# Patient Record
Sex: MALE | Race: WHITE | NOT HISPANIC OR LATINO | Employment: FULL TIME | URBAN - METROPOLITAN AREA
[De-identification: names, ages, dates, MRNs, and addresses within clinical notes are randomized per-mention and may not be internally consistent; named-entity substitution may affect disease eponyms.]

---

## 2019-05-08 ENCOUNTER — APPOINTMENT (OUTPATIENT)
Dept: CT IMAGING | Facility: HOSPITAL | Age: 54
End: 2019-05-08

## 2019-05-08 ENCOUNTER — HOSPITAL ENCOUNTER (OUTPATIENT)
Facility: HOSPITAL | Age: 54
Discharge: HOME | End: 2019-05-09
Attending: EMERGENCY MEDICINE | Admitting: INTERNAL MEDICINE

## 2019-05-08 ENCOUNTER — APPOINTMENT (OUTPATIENT)
Dept: GENERAL RADIOLOGY | Facility: HOSPITAL | Age: 54
End: 2019-05-08

## 2019-05-08 DIAGNOSIS — R55 SYNCOPE, UNSPECIFIED SYNCOPE TYPE: ICD-10-CM

## 2019-05-08 DIAGNOSIS — I20.0 UNSTABLE ANGINA (HCC): ICD-10-CM

## 2019-05-08 DIAGNOSIS — R07.9 CHEST PAIN, UNSPECIFIED TYPE: Primary | ICD-10-CM

## 2019-05-08 DIAGNOSIS — I25.10 CORONARY ARTERY DISEASE, ANGINA PRESENCE UNSPECIFIED, UNSPECIFIED VESSEL OR LESION TYPE, UNSPECIFIED WHETHER NATIVE OR TRANSPLANTED HEART: ICD-10-CM

## 2019-05-08 DIAGNOSIS — E87.6 HYPOKALEMIA: ICD-10-CM

## 2019-05-08 PROBLEM — F32.A DEPRESSION: Status: ACTIVE | Noted: 2019-05-08

## 2019-05-08 PROBLEM — E11.9 DM (DIABETES MELLITUS), TYPE 2: Status: ACTIVE | Noted: 2019-05-08

## 2019-05-08 PROBLEM — I49.3 FREQUENT UNIFOCAL PVCS: Status: ACTIVE | Noted: 2019-05-08

## 2019-05-08 PROBLEM — I10 ESSENTIAL HYPERTENSION: Status: ACTIVE | Noted: 2019-05-08

## 2019-05-08 PROBLEM — E78.5 HYPERLIPIDEMIA LDL GOAL <70: Status: ACTIVE | Noted: 2019-05-08

## 2019-05-08 LAB
ALBUMIN SERPL-MCNC: 3.9 G/DL (ref 3.5–5.2)
ALBUMIN/GLOB SERPL: 1.1 G/DL
ALP SERPL-CCNC: 61 U/L (ref 39–117)
ALT SERPL W P-5'-P-CCNC: 22 U/L (ref 1–41)
AMPHET+METHAMPHET UR QL: NEGATIVE
AMPHETAMINES UR QL: NEGATIVE
ANION GAP SERPL CALCULATED.3IONS-SCNC: 16 MMOL/L
AST SERPL-CCNC: 22 U/L (ref 1–40)
BARBITURATES UR QL SCN: NEGATIVE
BASOPHILS # BLD AUTO: 0.05 10*3/MM3 (ref 0–0.2)
BASOPHILS NFR BLD AUTO: 0.5 % (ref 0–1.5)
BENZODIAZ UR QL SCN: NEGATIVE
BILIRUB SERPL-MCNC: 0.6 MG/DL (ref 0.2–1.2)
BILIRUB UR QL STRIP: NEGATIVE
BUN BLD-MCNC: 14 MG/DL (ref 6–20)
BUN/CREAT SERPL: 13.1 (ref 7–25)
BUPRENORPHINE SERPL-MCNC: NEGATIVE NG/ML
CALCIUM SPEC-SCNC: 9.2 MG/DL (ref 8.6–10.5)
CANNABINOIDS SERPL QL: NEGATIVE
CHLORIDE SERPL-SCNC: 92 MMOL/L (ref 98–107)
CLARITY UR: CLEAR
CO2 SERPL-SCNC: 22 MMOL/L (ref 22–29)
COCAINE UR QL: NEGATIVE
COLOR UR: YELLOW
CREAT BLD-MCNC: 1.07 MG/DL (ref 0.76–1.27)
DEPRECATED RDW RBC AUTO: 39.4 FL (ref 37–54)
EOSINOPHIL # BLD AUTO: 0.38 10*3/MM3 (ref 0–0.4)
EOSINOPHIL NFR BLD AUTO: 3.9 % (ref 0.3–6.2)
ERYTHROCYTE [DISTWIDTH] IN BLOOD BY AUTOMATED COUNT: 13.1 % (ref 12.3–15.4)
GFR SERPL CREATININE-BSD FRML MDRD: 72 ML/MIN/1.73
GLOBULIN UR ELPH-MCNC: 3.5 GM/DL
GLUCOSE BLD-MCNC: 284 MG/DL (ref 65–99)
GLUCOSE BLDC GLUCOMTR-MCNC: 191 MG/DL (ref 70–130)
GLUCOSE UR STRIP-MCNC: ABNORMAL MG/DL
HCT VFR BLD AUTO: 40.7 % (ref 37.5–51)
HGB BLD-MCNC: 13.9 G/DL (ref 13–17.7)
HGB UR QL STRIP.AUTO: NEGATIVE
HOLD SPECIMEN: NORMAL
HOLD SPECIMEN: NORMAL
IMM GRANULOCYTES # BLD AUTO: 0.03 10*3/MM3 (ref 0–0.05)
IMM GRANULOCYTES NFR BLD AUTO: 0.3 % (ref 0–0.5)
KETONES UR QL STRIP: ABNORMAL
LEUKOCYTE ESTERASE UR QL STRIP.AUTO: NEGATIVE
LIPASE SERPL-CCNC: 87 U/L (ref 13–60)
LYMPHOCYTES # BLD AUTO: 2.08 10*3/MM3 (ref 0.7–3.1)
LYMPHOCYTES NFR BLD AUTO: 21.4 % (ref 19.6–45.3)
MAGNESIUM SERPL-MCNC: 1.6 MG/DL (ref 1.6–2.6)
MCH RBC QN AUTO: 28.2 PG (ref 26.6–33)
MCHC RBC AUTO-ENTMCNC: 34.2 G/DL (ref 31.5–35.7)
MCV RBC AUTO: 82.6 FL (ref 79–97)
METHADONE UR QL SCN: NEGATIVE
MONOCYTES # BLD AUTO: 0.95 10*3/MM3 (ref 0.1–0.9)
MONOCYTES NFR BLD AUTO: 9.8 % (ref 5–12)
NEUTROPHILS # BLD AUTO: 6.26 10*3/MM3 (ref 1.7–7)
NEUTROPHILS NFR BLD AUTO: 64.4 % (ref 42.7–76)
NITRITE UR QL STRIP: NEGATIVE
NT-PROBNP SERPL-MCNC: 74 PG/ML (ref 5–900)
OPIATES UR QL: NEGATIVE
OXYCODONE UR QL SCN: NEGATIVE
PCP UR QL SCN: NEGATIVE
PH UR STRIP.AUTO: <=5 [PH] (ref 5–8)
PLATELET # BLD AUTO: 209 10*3/MM3 (ref 140–450)
PMV BLD AUTO: 9.8 FL (ref 6–12)
POTASSIUM BLD-SCNC: 2.8 MMOL/L (ref 3.5–5.2)
PROPOXYPH UR QL: NEGATIVE
PROT SERPL-MCNC: 7.4 G/DL (ref 6–8.5)
PROT UR QL STRIP: NEGATIVE
RBC # BLD AUTO: 4.93 10*6/MM3 (ref 4.14–5.8)
SODIUM BLD-SCNC: 130 MMOL/L (ref 136–145)
SP GR UR STRIP: 1.04 (ref 1–1.03)
TRICYCLICS UR QL SCN: POSITIVE
TROPONIN T SERPL-MCNC: <0.01 NG/ML (ref 0–0.03)
UROBILINOGEN UR QL STRIP: ABNORMAL
WBC NRBC COR # BLD: 9.72 10*3/MM3 (ref 3.4–10.8)
WHOLE BLOOD HOLD SPECIMEN: NORMAL
WHOLE BLOOD HOLD SPECIMEN: NORMAL

## 2019-05-08 PROCEDURE — 80306 DRUG TEST PRSMV INSTRMNT: CPT | Performed by: EMERGENCY MEDICINE

## 2019-05-08 PROCEDURE — 96366 THER/PROPH/DIAG IV INF ADDON: CPT

## 2019-05-08 PROCEDURE — 84484 ASSAY OF TROPONIN QUANT: CPT | Performed by: EMERGENCY MEDICINE

## 2019-05-08 PROCEDURE — 83735 ASSAY OF MAGNESIUM: CPT | Performed by: EMERGENCY MEDICINE

## 2019-05-08 PROCEDURE — G0378 HOSPITAL OBSERVATION PER HR: HCPCS

## 2019-05-08 PROCEDURE — 99205 OFFICE O/P NEW HI 60 MIN: CPT | Performed by: INTERNAL MEDICINE

## 2019-05-08 PROCEDURE — 96365 THER/PROPH/DIAG IV INF INIT: CPT

## 2019-05-08 PROCEDURE — 83690 ASSAY OF LIPASE: CPT | Performed by: EMERGENCY MEDICINE

## 2019-05-08 PROCEDURE — 71045 X-RAY EXAM CHEST 1 VIEW: CPT

## 2019-05-08 PROCEDURE — 93005 ELECTROCARDIOGRAM TRACING: CPT | Performed by: EMERGENCY MEDICINE

## 2019-05-08 PROCEDURE — 82962 GLUCOSE BLOOD TEST: CPT

## 2019-05-08 PROCEDURE — 99285 EMERGENCY DEPT VISIT HI MDM: CPT

## 2019-05-08 PROCEDURE — 96361 HYDRATE IV INFUSION ADD-ON: CPT

## 2019-05-08 PROCEDURE — 93005 ELECTROCARDIOGRAM TRACING: CPT

## 2019-05-08 PROCEDURE — 81003 URINALYSIS AUTO W/O SCOPE: CPT | Performed by: EMERGENCY MEDICINE

## 2019-05-08 PROCEDURE — 96372 THER/PROPH/DIAG INJ SC/IM: CPT

## 2019-05-08 PROCEDURE — 70450 CT HEAD/BRAIN W/O DYE: CPT

## 2019-05-08 PROCEDURE — 25010000002 ENOXAPARIN PER 10 MG: Performed by: PHYSICIAN ASSISTANT

## 2019-05-08 PROCEDURE — 71275 CT ANGIOGRAPHY CHEST: CPT

## 2019-05-08 PROCEDURE — 0 IOPAMIDOL PER 1 ML: Performed by: EMERGENCY MEDICINE

## 2019-05-08 PROCEDURE — 80053 COMPREHEN METABOLIC PANEL: CPT | Performed by: EMERGENCY MEDICINE

## 2019-05-08 PROCEDURE — 85025 COMPLETE CBC W/AUTO DIFF WBC: CPT

## 2019-05-08 PROCEDURE — 84484 ASSAY OF TROPONIN QUANT: CPT | Performed by: PHYSICIAN ASSISTANT

## 2019-05-08 PROCEDURE — 83880 ASSAY OF NATRIURETIC PEPTIDE: CPT | Performed by: EMERGENCY MEDICINE

## 2019-05-08 PROCEDURE — 25010000002 THIAMINE PER 100 MG: Performed by: EMERGENCY MEDICINE

## 2019-05-08 RX ORDER — ALUMINA, MAGNESIA, AND SIMETHICONE 2400; 2400; 240 MG/30ML; MG/30ML; MG/30ML
15 SUSPENSION ORAL EVERY 6 HOURS PRN
Status: DISCONTINUED | OUTPATIENT
Start: 2019-05-08 | End: 2019-05-09 | Stop reason: HOSPADM

## 2019-05-08 RX ORDER — SODIUM CHLORIDE 0.9 % (FLUSH) 0.9 %
10 SYRINGE (ML) INJECTION AS NEEDED
Status: DISCONTINUED | OUTPATIENT
Start: 2019-05-08 | End: 2019-05-09 | Stop reason: HOSPADM

## 2019-05-08 RX ORDER — ASPIRIN 325 MG
325 TABLET ORAL DAILY
COMMUNITY
End: 2022-03-04 | Stop reason: DRUGHIGH

## 2019-05-08 RX ORDER — NICOTINE POLACRILEX 4 MG
15 LOZENGE BUCCAL
Status: DISCONTINUED | OUTPATIENT
Start: 2019-05-08 | End: 2019-05-08 | Stop reason: SDUPTHER

## 2019-05-08 RX ORDER — ASPIRIN 325 MG
325 TABLET ORAL DAILY
Status: DISCONTINUED | OUTPATIENT
Start: 2019-05-09 | End: 2019-05-09 | Stop reason: HOSPADM

## 2019-05-08 RX ORDER — FAMOTIDINE 20 MG/1
40 TABLET, FILM COATED ORAL DAILY
Status: DISCONTINUED | OUTPATIENT
Start: 2019-05-09 | End: 2019-05-09 | Stop reason: HOSPADM

## 2019-05-08 RX ORDER — ACETAMINOPHEN 325 MG/1
650 TABLET ORAL EVERY 4 HOURS PRN
Status: DISCONTINUED | OUTPATIENT
Start: 2019-05-08 | End: 2019-05-09 | Stop reason: HOSPADM

## 2019-05-08 RX ORDER — ASPIRIN 325 MG
325 TABLET ORAL DAILY
Status: DISCONTINUED | OUTPATIENT
Start: 2019-05-09 | End: 2019-05-08 | Stop reason: SDUPTHER

## 2019-05-08 RX ORDER — ASPIRIN 81 MG/1
324 TABLET, CHEWABLE ORAL ONCE
Status: DISCONTINUED | OUTPATIENT
Start: 2019-05-08 | End: 2019-05-09 | Stop reason: HOSPADM

## 2019-05-08 RX ORDER — LISINOPRIL 40 MG/1
40 TABLET ORAL DAILY
Status: DISCONTINUED | OUTPATIENT
Start: 2019-05-09 | End: 2019-05-09 | Stop reason: HOSPADM

## 2019-05-08 RX ORDER — ROSUVASTATIN CALCIUM 20 MG/1
40 TABLET, COATED ORAL DAILY
Status: DISCONTINUED | OUTPATIENT
Start: 2019-05-09 | End: 2019-05-09 | Stop reason: HOSPADM

## 2019-05-08 RX ORDER — DEXTROSE MONOHYDRATE 25 G/50ML
25 INJECTION, SOLUTION INTRAVENOUS
Status: DISCONTINUED | OUTPATIENT
Start: 2019-05-08 | End: 2019-05-08 | Stop reason: SDUPTHER

## 2019-05-08 RX ORDER — SODIUM CHLORIDE 9 MG/ML
125 INJECTION, SOLUTION INTRAVENOUS CONTINUOUS
Status: DISCONTINUED | OUTPATIENT
Start: 2019-05-08 | End: 2019-05-09 | Stop reason: HOSPADM

## 2019-05-08 RX ORDER — FLUOXETINE HYDROCHLORIDE 20 MG/1
40 CAPSULE ORAL DAILY
Status: DISCONTINUED | OUTPATIENT
Start: 2019-05-09 | End: 2019-05-09 | Stop reason: HOSPADM

## 2019-05-08 RX ORDER — NITROGLYCERIN 0.4 MG/1
0.4 TABLET SUBLINGUAL
Status: DISCONTINUED | OUTPATIENT
Start: 2019-05-08 | End: 2019-05-09 | Stop reason: HOSPADM

## 2019-05-08 RX ORDER — COLESEVELAM 180 1/1
1875 TABLET ORAL 2 TIMES DAILY
COMMUNITY

## 2019-05-08 RX ORDER — SODIUM CHLORIDE 0.9 % (FLUSH) 0.9 %
3-10 SYRINGE (ML) INJECTION AS NEEDED
Status: DISCONTINUED | OUTPATIENT
Start: 2019-05-08 | End: 2019-05-09 | Stop reason: HOSPADM

## 2019-05-08 RX ORDER — DEXTROSE MONOHYDRATE 25 G/50ML
25 INJECTION, SOLUTION INTRAVENOUS
Status: DISCONTINUED | OUTPATIENT
Start: 2019-05-08 | End: 2019-05-09 | Stop reason: HOSPADM

## 2019-05-08 RX ORDER — ONDANSETRON 2 MG/ML
4 INJECTION INTRAMUSCULAR; INTRAVENOUS EVERY 6 HOURS PRN
Status: DISCONTINUED | OUTPATIENT
Start: 2019-05-08 | End: 2019-05-09 | Stop reason: HOSPADM

## 2019-05-08 RX ORDER — FLUOXETINE HYDROCHLORIDE 40 MG/1
40 CAPSULE ORAL DAILY
COMMUNITY

## 2019-05-08 RX ORDER — AMLODIPINE BESYLATE 10 MG/1
5 TABLET ORAL DAILY
COMMUNITY
End: 2021-02-25 | Stop reason: DRUGHIGH

## 2019-05-08 RX ORDER — ROSUVASTATIN CALCIUM 40 MG/1
40 TABLET, COATED ORAL DAILY
COMMUNITY

## 2019-05-08 RX ORDER — CHLORTHALIDONE 25 MG/1
25 TABLET ORAL DAILY
COMMUNITY
End: 2019-05-09 | Stop reason: HOSPADM

## 2019-05-08 RX ORDER — SENNA AND DOCUSATE SODIUM 50; 8.6 MG/1; MG/1
2 TABLET, FILM COATED ORAL 2 TIMES DAILY PRN
Status: DISCONTINUED | OUTPATIENT
Start: 2019-05-08 | End: 2019-05-09 | Stop reason: HOSPADM

## 2019-05-08 RX ORDER — GLIPIZIDE 10 MG/1
10 TABLET ORAL
COMMUNITY
End: 2022-03-04 | Stop reason: ALTCHOICE

## 2019-05-08 RX ORDER — SODIUM CHLORIDE 0.9 % (FLUSH) 0.9 %
3 SYRINGE (ML) INJECTION EVERY 12 HOURS SCHEDULED
Status: DISCONTINUED | OUTPATIENT
Start: 2019-05-08 | End: 2019-05-09 | Stop reason: HOSPADM

## 2019-05-08 RX ORDER — ONDANSETRON 4 MG/1
4 TABLET, FILM COATED ORAL EVERY 6 HOURS PRN
Status: DISCONTINUED | OUTPATIENT
Start: 2019-05-08 | End: 2019-05-09 | Stop reason: HOSPADM

## 2019-05-08 RX ORDER — POTASSIUM CHLORIDE 750 MG/1
10 TABLET, FILM COATED, EXTENDED RELEASE ORAL 2 TIMES DAILY
COMMUNITY
End: 2019-05-09 | Stop reason: HOSPADM

## 2019-05-08 RX ORDER — NICOTINE POLACRILEX 4 MG
15 LOZENGE BUCCAL
Status: DISCONTINUED | OUTPATIENT
Start: 2019-05-08 | End: 2019-05-09 | Stop reason: HOSPADM

## 2019-05-08 RX ORDER — LISINOPRIL 40 MG/1
40 TABLET ORAL DAILY
COMMUNITY

## 2019-05-08 RX ORDER — POTASSIUM CHLORIDE 750 MG/1
40 CAPSULE, EXTENDED RELEASE ORAL ONCE
Status: COMPLETED | OUTPATIENT
Start: 2019-05-08 | End: 2019-05-08

## 2019-05-08 RX ADMIN — METOPROLOL TARTRATE 25 MG: 25 TABLET ORAL at 22:42

## 2019-05-08 RX ADMIN — THIAMINE HYDROCHLORIDE 100 MG: 100 INJECTION, SOLUTION INTRAMUSCULAR; INTRAVENOUS at 16:33

## 2019-05-08 RX ADMIN — POTASSIUM CHLORIDE 40 MEQ: 750 CAPSULE, EXTENDED RELEASE ORAL at 16:29

## 2019-05-08 RX ADMIN — ACETAMINOPHEN 650 MG: 325 TABLET ORAL at 19:20

## 2019-05-08 RX ADMIN — SODIUM CHLORIDE, PRESERVATIVE FREE 3 ML: 5 INJECTION INTRAVENOUS at 20:52

## 2019-05-08 RX ADMIN — SODIUM CHLORIDE 1000 ML: 9 INJECTION, SOLUTION INTRAVENOUS at 16:25

## 2019-05-08 RX ADMIN — SODIUM CHLORIDE 125 ML/HR: 9 INJECTION, SOLUTION INTRAVENOUS at 16:59

## 2019-05-08 RX ADMIN — IOPAMIDOL 75 ML: 755 INJECTION, SOLUTION INTRAVENOUS at 17:22

## 2019-05-08 RX ADMIN — ENOXAPARIN SODIUM 40 MG: 40 INJECTION SUBCUTANEOUS at 22:42

## 2019-05-08 NOTE — H&P
"[facility] at [facility]        Date of Hospital Visit: 19      Place of Service: [facility]    Patient Name: Andry Beth  :1965    Referral Provider: ED Physician  Primary Care Provider: Kevin Silvestre DO    Chief complaint/Reason for Consultation:  chest pain    Problem List:  Patient Active Problem List    Diagnosis    • Unstable angina (CMS/HCC)      Priority: High   • Syncope and collapse      Priority: High   • CAD (coronary artery disease)      Priority: High     Note Last Updated: 2019     1.  CABG x 4 (3-4-2008) LIMA to LAD, SVG diag and circ, SVG to RCA  2. Stents to unknown vessels  at [facility]   • Hypokalemia      Priority: High   • Frequent unifocal PVCs      Priority: High   • Essential hypertension      Priority: Medium   • Hyperlipidemia LDL goal <70      Priority: Medium   • DM (diabetes mellitus), type 2 (CMS/HCC)      Priority: Low   • Depression      History of Present Illness:  This is a 53-year old hypertensive dyslipidemic diabetic male with known coronary artery disease.  He underwent CABG x4 in  followed by stents to unknown vessels at [facility] in .  He has a cardiologist in [facility] whom he is only seen once approximately a year ago.  He was doing well from a cardiac standpoint until about 3 days ago when he began having progressive weakness which he attributed to environmental allergies.  He works at night and upon returning home this morning had apparent syncope which he has no recall.  States the last thing he remembers was turning off his truck and then being loaded into the helicopter for transport to [facility].  He reportedly \"passed out\" 2-3 times.  He reports having had shooting severe midsternal chest pain at least on 2 separate occasions the first being at approximately midnight last night and the second when being loaded to the helicopter.  He was given sublingual " nitroglycerin and Versed by flight paramedics.  His emergency department evaluation here has revealed frequent PVCs on EKG.  His initial troponin is negative.  His labs are significant for severely low potassium at 2.8, mild hyponatremia and hyperglycemia.  His chest x-ray is unremarkable.  He is just had a CT scan of the head and chest with preliminary reports negative for pathology.  At present he is alert and comfortable and in no apparent distress.  He denies orthopnea, PND, claudication, lower extremity edema.  He has no awareness of tachyarrhythmias, denies dizziness.    Past Medical History:   Diagnosis Date   • Hypertension        Past Surgical History:   Procedure Laterality Date   • CARDIAC SURGERY      HEART BYPASS SURGERY   • CHOLECYSTECTOMY         No Known Allergies      (Not in a hospital admission)  Home medications: Patient does not have a list or bottles but from memory takes:  Hydrochlorothiazide  Lisinopril 10 mg daily  Metoprolol  A depression pill  Jardiance  A cholesterol pill  Aspirin 3 and 25 mg daily  Metformin thousand milligrams      Current Facility-Administered Medications:   •  aspirin chewable tablet 324 mg, 324 mg, Oral, Once, Tian Hastings MD  •  sodium chloride 0.9 % flush 10 mL, 10 mL, Intravenous, PRN, Tian Hastings MD  •  sodium chloride 0.9 % infusion, 125 mL/hr, Intravenous, Continuous, Tian Hastings MD, Last Rate: 125 mL/hr at 05/08/19 1659, 125 mL/hr at 05/08/19 1659  •  thiamine (B-1) 100 mg in sodium chloride 0.9 % 100 mL IVPB, 100 mg, Intravenous, Daily, Tian Hastings MD, Last Rate: 200 mL/hr at 05/08/19 1633, 100 mg at 05/08/19 1633  No current outpatient medications on file.      Social History     Socioeconomic History   • Marital status:      Spouse name: Not on file   • Number of children: Not on file   • Years of education: Not on file   • Highest education level: Not on file   Occupational History   • Occupation:    Tobacco Use   •  "Smoking status: Never Smoker   • Smokeless tobacco: Never Used   Substance and Sexual Activity   • Alcohol use: No     Frequency: Never   • Drug use: No   • Sexual activity: Defer       Family History   Problem Relation Age of Onset   • Heart disease Mother         50's   • Heart disease Father         late 50's       REVIEW OF SYSTEMS:   Review of Systems   Constitution: Positive for weakness and malaise/fatigue.   HENT: Positive for congestion and hoarse voice.    Eyes: Negative.    Cardiovascular: Positive for chest pain. Negative for dyspnea on exertion, irregular heartbeat, leg swelling, orthopnea, palpitations and paroxysmal nocturnal dyspnea.   Respiratory: Negative.    Endocrine: Negative.    Hematologic/Lymphatic: Negative.    Skin: Negative.    Musculoskeletal: Negative.    Gastrointestinal: Negative.    Genitourinary: Negative.    Psychiatric/Behavioral: Positive for depression.   Allergic/Immunologic: Negative.    All other systems reviewed and are negative.           Objective:  Vitals:    05/08/19 1522 05/08/19 1530 05/08/19 1600 05/08/19 1630   BP: 129/84 115/84 118/83 120/81   Pulse: 114 113 104 110   Resp: 16      Temp: 98.7 °F (37.1 °C)      TempSrc: Oral      SpO2: 92% 96% 93% 97%   Weight:       Height:         Body mass index is 29.29 kg/m².  Flowsheet Rows      First Filed Value   Admission Height  180.3 cm (71\") Documented at 05/08/2019 1519   Admission Weight  95.3 kg (210 lb) Documented at 05/08/2019 1519          Intake/Output Summary (Last 24 hours) at 5/8/2019 1748  Last data filed at 5/8/2019 1659  Gross per 24 hour   Intake 1000 ml   Output --   Net 1000 ml       Physical Exam   Constitutional: He is oriented to person, place, and time. He appears well-developed and well-nourished.   HENT:   Head: Normocephalic and atraumatic.   Mouth/Throat: Oropharynx is clear and moist.   Eyes: EOM are normal. Pupils are equal, round, and reactive to light. No scleral icterus.   Neck: Neck supple. No " JVD present. No thyromegaly present.   Cardiovascular: Normal rate, regular rhythm and normal heart sounds. Exam reveals no gallop and no friction rub.   No murmur heard.  Pulmonary/Chest: Breath sounds normal. No stridor. He has no wheezes. He has no rales.   Abdominal: Soft. Bowel sounds are normal. He exhibits no distension and no mass. There is no tenderness.   Musculoskeletal: Normal range of motion. He exhibits no edema, tenderness or deformity.   Lymphadenopathy:     He has no cervical adenopathy.   Neurological: He is alert and oriented to person, place, and time. No cranial nerve deficit. Coordination normal.   Skin: Skin is warm and dry. No rash noted. No erythema.   Psychiatric: He has a normal mood and affect.   Vitals reviewed.               Lab Review:                Results from last 7 days   Lab Units 05/08/19  1525   SODIUM mmol/L 130*   POTASSIUM mmol/L 2.8*   CHLORIDE mmol/L 92*   CO2 mmol/L 22.0   BUN mg/dL 14   CREATININE mg/dL 1.07   GLUCOSE mg/dL 284*   CALCIUM mg/dL 9.2     Results from last 7 days   Lab Units 05/08/19  1525   TROPONIN T ng/mL <0.010     Results from last 7 days   Lab Units 05/08/19  1525   WBC 10*3/mm3 9.72   HEMOGLOBIN g/dL 13.9   HEMATOCRIT % 40.7   PLATELETS 10*3/mm3 209         Results from last 7 days   Lab Units 05/08/19  1525   MAGNESIUM mg/dL 1.6         EKG: Sinus tachycardia, frequent PVCs.          Assessment:   1. Unstable angina  2. Known early onset coronary artery disease status post CABG x4 with subsequent stents to unknown vessels  3. Syncope and collapse  4. Severe hypokalemia  5. Mild hyponatremia  6. Diabetes mellitus type 2 with hyperglycemia  7. Hypertension  8. Dyslipidemia    Plan:   1. Hospital admission to telemetry floor.  2. Serial enzymes and EKGs, rule out MI  3. Supplement potassium, optimize medical management per standard guidelines.  4. Left heart catheterization possible catheter-based intervention in the morning for further assessment of  ischemia etiology of chest pain and arrhythmias.    5. Obtain accurate medicine list when family arrives  6. Further management per clinical course.    Scribed for Leo Hicks MD. by Electronically signed by MARÍA ELENA Houston, 05/08/19, 6:01 PM.      I, Leo Hicks MD, personally performed the services described in this documentation as scribed by the above named individual in my presence, and it is both accurate and complete.  5/8/2019  7:04 PM

## 2019-05-08 NOTE — ED PROVIDER NOTES
Subjective   Andry Beth is a 53 y.o.male who presents to the emergency department with complaints of chest pain. The patient states that he has had multiple episodes of chest pain over the last two days in association with nausea, shortness of breath, generalized weakness, and diaphoresis. He is not sure what triggered his chest pain. It is non-exertional. He had several syncopal episodes today as well but is unable to recall if he was having chest pain before he passed out. His wife finally convinced him to go to primary care today. While there he was complaining of chest pain and shortness of breath so they called EMS. EMS gave him 324 mg of aspirin and one nitroglycerin before calling flight crew to bring the patient here. He was noted to be anxious and tachypneic when flight crew arrived and was given 5 mg of Versed prior to arrival. Here in the emergency department his only complaint is of a headache. His chest pain has resolved. He denies history of seizures. He's had no melena, hematochezia, hematuria, or rectal bleeding. He denies fevers, chills, vomiting, or recent illness. He has no urinary symptoms including dysuria, urgency, or frequency. There are no other acute complaints at this time.        History provided by:  Patient and EMS personnel  Chest Pain   Pain location:  Unable to specify  Pain radiates to:  Does not radiate  Pain severity:  Moderate  Onset quality:  Sudden  Duration:  2 days  Timing:  Intermittent  Progression:  Resolved  Chronicity:  New  Relieved by:  None tried  Worsened by:  Nothing  Ineffective treatments:  None tried  Associated symptoms: anxiety, diaphoresis, headache, nausea, shortness of breath and weakness    Associated symptoms: no cough, no fever and no vomiting    Risk factors: hypertension and male sex        Review of Systems   Constitutional: Positive for diaphoresis. Negative for chills and fever.   HENT: Negative for congestion and rhinorrhea.    Respiratory: Positive  for shortness of breath. Negative for cough.    Cardiovascular: Positive for chest pain.   Gastrointestinal: Positive for nausea. Negative for anal bleeding, blood in stool and vomiting.   Genitourinary: Negative for dysuria, frequency, hematuria and urgency.   Neurological: Positive for syncope, weakness and headaches.   All other systems reviewed and are negative.      Past Medical History:   Diagnosis Date   • Hypertension        No Known Allergies    Past Surgical History:   Procedure Laterality Date   • CARDIAC SURGERY      HEART BYPASS SURGERY   • CHOLECYSTECTOMY         Family History   Problem Relation Age of Onset   • Heart disease Mother         50's   • Heart disease Father         late 50's       Social History     Socioeconomic History   • Marital status:      Spouse name: Not on file   • Number of children: Not on file   • Years of education: Not on file   • Highest education level: Not on file   Occupational History   • Occupation:    Tobacco Use   • Smoking status: Never Smoker   • Smokeless tobacco: Never Used   Substance and Sexual Activity   • Alcohol use: No     Frequency: Never   • Drug use: No   • Sexual activity: Defer         Objective   Physical Exam   Constitutional: He is oriented to person, place, and time. He appears well-developed and well-nourished. No distress.   HENT:   Head: Normocephalic and atraumatic.   Mouth/Throat: Oropharynx is clear and moist.   Eyes: Conjunctivae are normal. No scleral icterus.   Neck: Normal range of motion. Neck supple.   Cardiovascular: Regular rhythm and normal heart sounds. Tachycardia present.   No murmur heard.  Pulmonary/Chest: Effort normal and breath sounds normal. No respiratory distress.   Abdominal: Soft. There is no tenderness.   Musculoskeletal: He exhibits no edema.   Neurological: He is alert and oriented to person, place, and time. He has normal strength and normal reflexes. No cranial nerve deficit or sensory deficit.  Coordination normal.   Reflex Scores:       Patellar reflexes are 2+ on the right side and 2+ on the left side.  DTR's, sensation, and strength intact. Cranial nerves 2-12 intact. Finger to nose testing is normal.   Skin: Skin is warm and dry. No rash noted. No erythema.   Psychiatric: He has a normal mood and affect. His behavior is normal.   Nursing note and vitals reviewed.      Procedures         ED Course  ED Course as of May 08 1809   Wed May 08, 2019   1619 I discussed findings at length with the patient.  He has a history of CAD with chest pain over the last 2 days.  He said shortness of breath as well.  He has no current chest pain.  Laboratory evaluation reveals a normal troponin normal BNP with an elevated glucose low sodium and low potassium.  I have ordered potassium supplementation.  I have ordered IV fluid bolus and infusion.  Will obtain a CT angiogram of his chest for evaluation of PE.  I have consulted cardiologyThe patient has significant effusion about the events of today as he was given 5 mg of Versed prior to arrival by the paramedic crew.  [HH]   1808 She was seen and evaluated by cardiology.  They will admit for definitive inpatient care.  CTA is negative per Dr. Way.  Initial troponin is negative with follow-up troponin negative.  The patient remains asymptomatic here in the ED.  His reliability however is diminished after the Versed when he comes to questioning.  He however remains hemodynamically stable with significant improved tachycardia  [HH]      ED Course User Index  [HH] Tian Hastings MD     Recent Results (from the past 24 hour(s))   Troponin    Collection Time: 05/08/19  3:25 PM   Result Value Ref Range    Troponin T <0.010 0.000 - 0.030 ng/mL   Comprehensive Metabolic Panel    Collection Time: 05/08/19  3:25 PM   Result Value Ref Range    Glucose 284 (H) 65 - 99 mg/dL    BUN 14 6 - 20 mg/dL    Creatinine 1.07 0.76 - 1.27 mg/dL    Sodium 130 (L) 136 - 145 mmol/L    Potassium  2.8 (L) 3.5 - 5.2 mmol/L    Chloride 92 (L) 98 - 107 mmol/L    CO2 22.0 22.0 - 29.0 mmol/L    Calcium 9.2 8.6 - 10.5 mg/dL    Total Protein 7.4 6.0 - 8.5 g/dL    Albumin 3.90 3.50 - 5.20 g/dL    ALT (SGPT) 22 1 - 41 U/L    AST (SGOT) 22 1 - 40 U/L    Alkaline Phosphatase 61 39 - 117 U/L    Total Bilirubin 0.6 0.2 - 1.2 mg/dL    eGFR Non African Amer 72 >60 mL/min/1.73    Globulin 3.5 gm/dL    A/G Ratio 1.1 g/dL    BUN/Creatinine Ratio 13.1 7.0 - 25.0    Anion Gap 16.0 mmol/L   Lipase    Collection Time: 05/08/19  3:25 PM   Result Value Ref Range    Lipase 87 (H) 13 - 60 U/L   BNP    Collection Time: 05/08/19  3:25 PM   Result Value Ref Range    proBNP 74.0 5.0 - 900.0 pg/mL   Light Blue Top    Collection Time: 05/08/19  3:25 PM   Result Value Ref Range    Extra Tube hold for add-on    Green Top (Gel)    Collection Time: 05/08/19  3:25 PM   Result Value Ref Range    Extra Tube Hold for add-ons.    Lavender Top    Collection Time: 05/08/19  3:25 PM   Result Value Ref Range    Extra Tube hold for add-on    Gold Top - SST    Collection Time: 05/08/19  3:25 PM   Result Value Ref Range    Extra Tube Hold for add-ons.    CBC Auto Differential    Collection Time: 05/08/19  3:25 PM   Result Value Ref Range    WBC 9.72 3.40 - 10.80 10*3/mm3    RBC 4.93 4.14 - 5.80 10*6/mm3    Hemoglobin 13.9 13.0 - 17.7 g/dL    Hematocrit 40.7 37.5 - 51.0 %    MCV 82.6 79.0 - 97.0 fL    MCH 28.2 26.6 - 33.0 pg    MCHC 34.2 31.5 - 35.7 g/dL    RDW 13.1 12.3 - 15.4 %    RDW-SD 39.4 37.0 - 54.0 fl    MPV 9.8 6.0 - 12.0 fL    Platelets 209 140 - 450 10*3/mm3    Neutrophil % 64.4 42.7 - 76.0 %    Lymphocyte % 21.4 19.6 - 45.3 %    Monocyte % 9.8 5.0 - 12.0 %    Eosinophil % 3.9 0.3 - 6.2 %    Basophil % 0.5 0.0 - 1.5 %    Immature Grans % 0.3 0.0 - 0.5 %    Neutrophils, Absolute 6.26 1.70 - 7.00 10*3/mm3    Lymphocytes, Absolute 2.08 0.70 - 3.10 10*3/mm3    Monocytes, Absolute 0.95 (H) 0.10 - 0.90 10*3/mm3    Eosinophils, Absolute 0.38 0.00 - 0.40  10*3/mm3    Basophils, Absolute 0.05 0.00 - 0.20 10*3/mm3    Immature Grans, Absolute 0.03 0.00 - 0.05 10*3/mm3   Magnesium    Collection Time: 05/08/19  3:25 PM   Result Value Ref Range    Magnesium 1.6 1.6 - 2.6 mg/dL     Note: In addition to lab results from this visit, the labs listed above may include labs taken at another facility or during a different encounter within the last 24 hours. Please correlate lab times with ED admission and discharge times for further clarification of the services performed during this visit.    XR Chest 1 View   Preliminary Result   Stable chest.  No acute cardiopulmonary disease.       D:  05/08/2019   E:  05/08/2019              CT Head Without Contrast    (Results Pending)   CT Angiogram Chest With Contrast    (Results Pending)     Vitals:    05/08/19 1522 05/08/19 1530 05/08/19 1600 05/08/19 1630   BP: 129/84 115/84 118/83 120/81   Pulse: 114 113 104 110   Resp: 16      Temp: 98.7 °F (37.1 °C)      TempSrc: Oral      SpO2: 92% 96% 93% 97%   Weight:       Height:         Medications   sodium chloride 0.9 % flush 10 mL (not administered)   aspirin chewable tablet 324 mg (324 mg Oral Not Given 5/8/19 1522)   sodium chloride 0.9 % infusion (125 mL/hr Intravenous New Bag 5/8/19 1659)   thiamine (B-1) 100 mg in sodium chloride 0.9 % 100 mL IVPB (100 mg Intravenous New Bag 5/8/19 1633)   sodium chloride 0.9 % bolus 1,000 mL (0 mL Intravenous Stopped 5/8/19 1659)   potassium chloride (MICRO-K) CR capsule 40 mEq (40 mEq Oral Given 5/8/19 1629)   iopamidol (ISOVUE-370) 76 % injection 100 mL (75 mL Intravenous Given 5/8/19 1722)     ECG/EMG Results (last 24 hours)     Procedure Component Value Units Date/Time    ECG 12 Lead [[id number]] Collected:  05/08/19 1523     Updated:  05/08/19 1521        ECG 12 Lead         ECG 12 Lead   Final Result   Test Reason : chest pain   Blood Pressure : **/** mmHG   Vent. Rate : 114 BPM     Atrial Rate : 114 BPM      P-R Int : 150 ms          QRS Dur :  094 ms       QT Int : 348 ms       P-R-T Axes : 034 014 051 degrees      QTc Int : 479 ms      Sinus tachycardia with frequent premature ventricular complexes   Nonspecific ST and T wave abnormality   Abnormal ECG   No previous ECGs available   Confirmed by TOLU HASTINGS MD ([id number]) on 5/8/2019 4:45:26 PM      Referred By:  ED MD           Confirmed By:TOLU HASTINGS MD         [facility]    Final diagnoses:   Chest pain, unspecified type   Syncope, unspecified syncope type   Hypokalemia       Documentation assistance provided by ino Pollard.  Information recorded by the scribe was done at my direction and has been verified and validated by me.     Kayla Pollard  05/08/19 2538       Tolu Hastings MD  05/08/19 4351

## 2019-05-08 NOTE — ED PROVIDER NOTES
Subjective   History of Present Illness    Review of Systems    Past Medical History:   Diagnosis Date   • Hypertension        No Known Allergies    Past Surgical History:   Procedure Laterality Date   • CARDIAC SURGERY      HEART BYPASS SURGERY   • CHOLECYSTECTOMY         History reviewed. No pertinent family history.    Social History     Tobacco Use   • Smoking status: Never Smoker   • Smokeless tobacco: Never Used   Substance and Sexual Activity   • Alcohol use: No     Frequency: Never   • Drug use: No   • Sexual activity: Defer           Objective   Physical Exam    Procedures           ED Course                  MDM      Final diagnoses:   None

## 2019-05-09 ENCOUNTER — TRANSCRIBE ORDERS (OUTPATIENT)
Dept: CARDIAC REHAB | Facility: HOSPITAL | Age: 54
End: 2019-05-09

## 2019-05-09 VITALS
HEIGHT: 73 IN | SYSTOLIC BLOOD PRESSURE: 118 MMHG | TEMPERATURE: 98 F | RESPIRATION RATE: 16 BRPM | DIASTOLIC BLOOD PRESSURE: 68 MMHG | WEIGHT: 208.9 LBS | OXYGEN SATURATION: 93 % | BODY MASS INDEX: 27.69 KG/M2 | HEART RATE: 81 BPM

## 2019-05-09 DIAGNOSIS — Z95.5 STENTED CORONARY ARTERY: Primary | ICD-10-CM

## 2019-05-09 PROBLEM — R07.9 CHEST PAIN: Status: ACTIVE | Noted: 2019-05-09

## 2019-05-09 LAB
ANION GAP SERPL CALCULATED.3IONS-SCNC: 10 MMOL/L
BUN BLD-MCNC: 11 MG/DL (ref 6–20)
BUN/CREAT SERPL: 11.8 (ref 7–25)
CALCIUM SPEC-SCNC: 8.9 MG/DL (ref 8.6–10.5)
CHLORIDE SERPL-SCNC: 104 MMOL/L (ref 98–107)
CHOLEST SERPL-MCNC: 109 MG/DL (ref 0–200)
CO2 SERPL-SCNC: 24 MMOL/L (ref 22–29)
CREAT BLD-MCNC: 0.93 MG/DL (ref 0.76–1.27)
DEPRECATED RDW RBC AUTO: 39.6 FL (ref 37–54)
ERYTHROCYTE [DISTWIDTH] IN BLOOD BY AUTOMATED COUNT: 13.2 % (ref 12.3–15.4)
GFR SERPL CREATININE-BSD FRML MDRD: 85 ML/MIN/1.73
GLUCOSE BLD-MCNC: 220 MG/DL (ref 65–99)
GLUCOSE BLDC GLUCOMTR-MCNC: 194 MG/DL (ref 70–130)
GLUCOSE BLDC GLUCOMTR-MCNC: 234 MG/DL (ref 70–130)
HBA1C MFR BLD: 11.9 % (ref 4.8–5.6)
HCT VFR BLD AUTO: 41.3 % (ref 37.5–51)
HDLC SERPL-MCNC: 32 MG/DL (ref 40–60)
HGB BLD-MCNC: 13.8 G/DL (ref 13–17.7)
LDLC SERPL CALC-MCNC: 32 MG/DL (ref 0–100)
LDLC/HDLC SERPL: 1 {RATIO}
MCH RBC QN AUTO: 27.8 PG (ref 26.6–33)
MCHC RBC AUTO-ENTMCNC: 33.4 G/DL (ref 31.5–35.7)
MCV RBC AUTO: 83.1 FL (ref 79–97)
PLATELET # BLD AUTO: 187 10*3/MM3 (ref 140–450)
PMV BLD AUTO: 9.5 FL (ref 6–12)
POTASSIUM BLD-SCNC: 4.3 MMOL/L (ref 3.5–5.2)
RBC # BLD AUTO: 4.97 10*6/MM3 (ref 4.14–5.8)
SODIUM BLD-SCNC: 138 MMOL/L (ref 136–145)
TRIGL SERPL-MCNC: 225 MG/DL (ref 0–150)
TROPONIN T SERPL-MCNC: <0.01 NG/ML (ref 0–0.03)
VLDLC SERPL-MCNC: 45 MG/DL
WBC NRBC COR # BLD: 8.32 10*3/MM3 (ref 3.4–10.8)

## 2019-05-09 PROCEDURE — C1769 GUIDE WIRE: HCPCS | Performed by: INTERNAL MEDICINE

## 2019-05-09 PROCEDURE — C9600 PERC DRUG-EL COR STENT SING: HCPCS | Performed by: INTERNAL MEDICINE

## 2019-05-09 PROCEDURE — 80048 BASIC METABOLIC PNL TOTAL CA: CPT | Performed by: PHYSICIAN ASSISTANT

## 2019-05-09 PROCEDURE — C1887 CATHETER, GUIDING: HCPCS | Performed by: INTERNAL MEDICINE

## 2019-05-09 PROCEDURE — 93459 L HRT ART/GRFT ANGIO: CPT | Performed by: INTERNAL MEDICINE

## 2019-05-09 PROCEDURE — C1894 INTRO/SHEATH, NON-LASER: HCPCS | Performed by: INTERNAL MEDICINE

## 2019-05-09 PROCEDURE — 85347 COAGULATION TIME ACTIVATED: CPT

## 2019-05-09 PROCEDURE — 0 IOPAMIDOL PER 1 ML: Performed by: INTERNAL MEDICINE

## 2019-05-09 PROCEDURE — 99024 POSTOP FOLLOW-UP VISIT: CPT | Performed by: INTERNAL MEDICINE

## 2019-05-09 PROCEDURE — G0378 HOSPITAL OBSERVATION PER HR: HCPCS

## 2019-05-09 PROCEDURE — 92928 PRQ TCAT PLMT NTRAC ST 1 LES: CPT | Performed by: INTERNAL MEDICINE

## 2019-05-09 PROCEDURE — C1874 STENT, COATED/COV W/DEL SYS: HCPCS | Performed by: INTERNAL MEDICINE

## 2019-05-09 PROCEDURE — 84484 ASSAY OF TROPONIN QUANT: CPT | Performed by: PHYSICIAN ASSISTANT

## 2019-05-09 PROCEDURE — 93005 ELECTROCARDIOGRAM TRACING: CPT | Performed by: INTERNAL MEDICINE

## 2019-05-09 PROCEDURE — 85027 COMPLETE CBC AUTOMATED: CPT | Performed by: PHYSICIAN ASSISTANT

## 2019-05-09 PROCEDURE — C1725 CATH, TRANSLUMIN NON-LASER: HCPCS | Performed by: INTERNAL MEDICINE

## 2019-05-09 PROCEDURE — 83036 HEMOGLOBIN GLYCOSYLATED A1C: CPT | Performed by: PHYSICIAN ASSISTANT

## 2019-05-09 PROCEDURE — 93567 NJX CAR CTH SPRVLV AORTGRPHY: CPT | Performed by: INTERNAL MEDICINE

## 2019-05-09 PROCEDURE — 80061 LIPID PANEL: CPT | Performed by: PHYSICIAN ASSISTANT

## 2019-05-09 PROCEDURE — 25010000002 MIDAZOLAM PER 1 MG: Performed by: INTERNAL MEDICINE

## 2019-05-09 PROCEDURE — 82962 GLUCOSE BLOOD TEST: CPT

## 2019-05-09 PROCEDURE — 25010000002 HEPARIN (PORCINE) PER 1000 UNITS: Performed by: INTERNAL MEDICINE

## 2019-05-09 DEVICE — STNT CORNRY RESOLUTE ONYX RX 2X18MM: Type: IMPLANTABLE DEVICE | Status: FUNCTIONAL

## 2019-05-09 RX ORDER — SODIUM CHLORIDE 9 MG/ML
100 INJECTION, SOLUTION INTRAVENOUS CONTINUOUS
Status: ACTIVE | OUTPATIENT
Start: 2019-05-09 | End: 2019-05-09

## 2019-05-09 RX ORDER — MIDAZOLAM HYDROCHLORIDE 1 MG/ML
INJECTION INTRAMUSCULAR; INTRAVENOUS AS NEEDED
Status: DISCONTINUED | OUTPATIENT
Start: 2019-05-09 | End: 2019-05-09 | Stop reason: HOSPADM

## 2019-05-09 RX ORDER — LIDOCAINE HYDROCHLORIDE 10 MG/ML
INJECTION, SOLUTION EPIDURAL; INFILTRATION; INTRACAUDAL; PERINEURAL AS NEEDED
Status: DISCONTINUED | OUTPATIENT
Start: 2019-05-09 | End: 2019-05-09 | Stop reason: HOSPADM

## 2019-05-09 RX ORDER — HEPARIN SODIUM 1000 [USP'U]/ML
INJECTION, SOLUTION INTRAVENOUS; SUBCUTANEOUS AS NEEDED
Status: DISCONTINUED | OUTPATIENT
Start: 2019-05-09 | End: 2019-05-09 | Stop reason: HOSPADM

## 2019-05-09 RX ORDER — CLOPIDOGREL BISULFATE 75 MG/1
TABLET ORAL AS NEEDED
Status: DISCONTINUED | OUTPATIENT
Start: 2019-05-09 | End: 2019-05-09 | Stop reason: HOSPADM

## 2019-05-09 RX ORDER — CLOPIDOGREL BISULFATE 75 MG/1
75 TABLET ORAL DAILY
Qty: 90 TABLET | Refills: 3 | Status: SHIPPED | OUTPATIENT
Start: 2019-05-09 | End: 2020-01-03

## 2019-05-09 RX ADMIN — FLUOXETINE HYDROCHLORIDE 40 MG: 20 CAPSULE ORAL at 09:09

## 2019-05-09 RX ADMIN — SODIUM CHLORIDE, PRESERVATIVE FREE 3 ML: 5 INJECTION INTRAVENOUS at 09:09

## 2019-05-09 RX ADMIN — FAMOTIDINE 40 MG: 20 TABLET ORAL at 09:05

## 2019-05-09 RX ADMIN — LISINOPRIL 40 MG: 40 TABLET ORAL at 09:05

## 2019-05-09 RX ADMIN — METOPROLOL TARTRATE 25 MG: 25 TABLET ORAL at 09:09

## 2019-05-09 RX ADMIN — ACETAMINOPHEN 650 MG: 325 TABLET ORAL at 09:06

## 2019-05-09 RX ADMIN — ROSUVASTATIN CALCIUM 40 MG: 20 TABLET, FILM COATED ORAL at 09:05

## 2019-05-09 NOTE — NURSING NOTE
Pt. Referred for Phase II Cardiac Rehab. Staff discussed benefits of exercise, program protocol, and educational material provided. Teach back verified.  Permission granted from patient for staff to fax referral information to outlying program at this time.  Staff to fax referral info to [facility].

## 2019-05-09 NOTE — PROGRESS NOTES
"[facility] at [facility]  IP Progress Note      Chief Complaint: Follow-up of unstable angina/CAD    Subjective   Complaining of nasal congestion due to allergies, denies chest pain or shortness of breath.    Objective     Blood pressure 104/64, pulse 98, temperature 98 °F (36.7 °C), temperature source Oral, resp. rate 16, height 185.4 cm (73\"), weight 94.8 kg (208 lb 14.4 oz), SpO2 98 %.     Intake/Output Summary (Last 24 hours) at 5/9/2019 0840  Last data filed at 5/8/2019 2149  Gross per 24 hour   Intake 1340 ml   Output 600 ml   Net 740 ml       Physical Exam:  General: No acute distress.   Neck: no JVD.  Chest:No respiratory distress, breath sounds are normal. No wheezes,  rhonchi or rales.  Cardiovascular: Normal S1 and S2, no murmer, gallop or rub.    Extremities: No edema.    Results Review:     I reviewed the patient's new clinical results.    Results from last 7 days   Lab Units 05/09/19  0629   WBC 10*3/mm3 8.32   HEMOGLOBIN g/dL 13.8   HEMATOCRIT % 41.3   PLATELETS 10*3/mm3 187     Results from last 7 days   Lab Units 05/09/19  0629 05/08/19  1525   SODIUM mmol/L 138 130*   POTASSIUM mmol/L 4.3 2.8*   CHLORIDE mmol/L 104 92*   CO2 mmol/L 24.0 22.0   BUN mg/dL 11 14   CREATININE mg/dL 0.93 1.07   CALCIUM mg/dL 8.9 9.2   BILIRUBIN mg/dL  --  0.6   ALK PHOS U/L  --  61   ALT (SGPT) U/L  --  22   AST (SGOT) U/L  --  22   GLUCOSE mg/dL 220* 284*     Results from last 7 days   Lab Units 05/09/19  0629   SODIUM mmol/L 138   POTASSIUM mmol/L 4.3   CHLORIDE mmol/L 104   CO2 mmol/L 24.0   BUN mg/dL 11   CREATININE mg/dL 0.93   GLUCOSE mg/dL 220*   CALCIUM mg/dL 8.9         Lab Results   Component Value Date    TROPONINT <0.010 05/09/2019         Results from last 7 days   Lab Units 05/09/19  0629   CHOLESTEROL mg/dL 109   TRIGLYCERIDES mg/dL 225*   HDL CHOL mg/dL 32*   LDL CHOL mg/dL 32           Tele: Sinus Rythym      Assessment:  1. Unstable angina  2. Known early onset coronary artery " disease status post CABG x4 with subsequent stents to unknown vessels  3. Syncope and collapse  4. Severe hypokalemia, supplemented with normal potassium this morning.  5. Mild hyponatremia  6. Diabetes mellitus type 2 with hyperglycemia  7. Hypertension  8. Dyslipidemia    Plan:  1. Cardiac catheterization study today showed severe triple-vessel disease of native coronary arteries.  Patent 3/4 grafts.  EF 55%.  2. 95% stenosis of distal circumflex leading into left PDA was stented with drug-eluting stent with excellent results.  3. Will discharge to home later today, discontinue diuretic therapy due to severe hypokalemia and frequent ventricular ectopy.  4. Cardiac monitor as outpatient to assess for arrhythmic etiology of his near syncope/syncope.  5. Follow-up with me in 4 to 6 weeks.    Leo Hicks MD, FACC, [facility]

## 2019-05-10 LAB — ACT BLD: 241 SECONDS (ref 82–152)

## 2019-06-14 ENCOUNTER — OFFICE VISIT (OUTPATIENT)
Dept: CARDIOLOGY | Facility: CLINIC | Age: 54
End: 2019-06-14

## 2019-06-14 VITALS
WEIGHT: 212.6 LBS | OXYGEN SATURATION: 98 % | SYSTOLIC BLOOD PRESSURE: 110 MMHG | BODY MASS INDEX: 28.18 KG/M2 | DIASTOLIC BLOOD PRESSURE: 68 MMHG | HEIGHT: 73 IN | HEART RATE: 107 BPM

## 2019-06-14 DIAGNOSIS — I25.810 CORONARY ARTERY DISEASE INVOLVING CORONARY BYPASS GRAFT OF NATIVE HEART WITHOUT ANGINA PECTORIS: Primary | ICD-10-CM

## 2019-06-14 DIAGNOSIS — E78.5 HYPERLIPIDEMIA LDL GOAL <70: ICD-10-CM

## 2019-06-14 DIAGNOSIS — I10 ESSENTIAL HYPERTENSION: ICD-10-CM

## 2019-06-14 PROCEDURE — 99214 OFFICE O/P EST MOD 30 MIN: CPT | Performed by: INTERNAL MEDICINE

## 2019-06-14 RX ORDER — POTASSIUM CHLORIDE 750 MG/1
10 TABLET, FILM COATED, EXTENDED RELEASE ORAL DAILY
COMMUNITY
Start: 2019-05-18

## 2019-06-14 RX ORDER — CHLORTHALIDONE 25 MG/1
25 TABLET ORAL DAILY
COMMUNITY

## 2019-06-14 RX ORDER — AMITRIPTYLINE HYDROCHLORIDE 100 MG/1
100 TABLET, FILM COATED ORAL NIGHTLY
Refills: 2 | COMMUNITY
Start: 2019-03-21 | End: 2021-02-25 | Stop reason: DRUGHIGH

## 2019-06-14 RX ORDER — EMPAGLIFLOZIN 25 MG/1
25 TABLET, FILM COATED ORAL DAILY
Refills: 2 | COMMUNITY
Start: 2019-05-15 | End: 2021-02-25

## 2019-06-14 NOTE — PROGRESS NOTES
Encounter Date:06/14/2019      Patient ID: Andry Beth is a 53 y.o. male.    PCP: Kevin Silvestre DO     Chief Complaint: Coronary Artery Disease; Hypertension; and Hyperlipidemia      PROBLEM LIST:  1. Coronary artery disease:  a. CABG x4, 03/04/2008: LIMA-LAD, SVG to diag and circ, SVG-RCA.  b. Stents to unknown vessel, 2010, [facility].  c. [facility], 05/09/2019: Severe 3-vessel disease of the native coronary arteries. Patent 3/4 bypass grafts (LIMA-LAD, sequential SVG to the diag and OM). Chronically occluded SVG to PDA. Successful PTCA/stenting of the distal circumflex into the left PDA reducing subtotal occlusion to no significant residual disease. Successful PTCA of the lateral jailed branch of the circumflex which maintained excellent patency. EF 60%.  d. 2 week Zio, 05/10/2019: Sinus rhythm, rare PAC's/PVC's.  2. Hypertension.  3. Hyperlipidemia.  4. DM2.  5. Syncope and collapse.  6. Depression.    History of Present Illness  Patient presents today for hospital follow-up with a history of coronary artery disease and cardiac risk factors. Since last visit, he has been doing well overall from a cardiovascular standpoint. Pt complains of dry mouth. He is a , and asks when he can return to work. Denies chest pain, shortness of breath, leg swelling, palpitations, and syncope. Remains busy and active with walking and climbing stairs with no significant cardiac limitations.  States that he had had a busy day at work had been tired did not take his medications or eat well and feels that it may have contributed to his episode of syncope.  He has not had any recurrence of such symptoms since that time.  He sees his primary care regularly and is trying to work on better control of diabetes.    No Known Allergies      Current Outpatient Medications:   •  amitriptyline (ELAVIL) 100 MG tablet, Every Night., Disp: , Rfl: 2  •  amLODIPine (NORVASC) 10 MG tablet, Take 5 mg by mouth Daily., Disp: , Rfl:   •   aspirin 325 MG tablet, Take 325 mg by mouth Daily., Disp: , Rfl:   •  chlorthalidone (HYGROTON) 25 MG tablet, Take 25 mg by mouth Daily., Disp: , Rfl:   •  clopidogrel (PLAVIX) 75 MG tablet, Take 1 tablet by mouth Daily., Disp: 90 tablet, Rfl: 3  •  colesevelam (WELCHOL) 625 MG tablet, Take 1,250 mg by mouth 2 (Two) Times a Day., Disp: , Rfl:   •  exenatide er (BYDUREON) 2 MG pen-injector injection, Inject 2 mg under the skin into the appropriate area as directed 1 (One) Time Per Week., Disp: , Rfl:   •  FLUoxetine (PROzac) 40 MG capsule, Take 40 mg by mouth Daily., Disp: , Rfl:   •  glipiZIDE (GLUCOTROL) 10 MG tablet, Take 10 mg by mouth 2 (Two) Times a Day Before Meals., Disp: , Rfl:   •  JARDIANCE 25 MG tablet, Daily., Disp: , Rfl: 2  •  Linagliptin-metFORMIN HCl (JENTADUETO) 2.5-1000 MG tablet, Take 1 tablet by mouth 2 (Two) Times a Day., Disp: , Rfl:   •  lisinopril (PRINIVIL,ZESTRIL) 40 MG tablet, Take 40 mg by mouth Daily., Disp: , Rfl:   •  metoprolol tartrate (LOPRESSOR) 25 MG tablet, Take 25 mg by mouth 2 (Two) Times a Day., Disp: , Rfl:   •  potassium chloride (K-DUR) 10 MEQ CR tablet, Daily., Disp: , Rfl:   •  rosuvastatin (CRESTOR) 40 MG tablet, Take 40 mg by mouth Daily., Disp: , Rfl:     The following portions of the patient's history were reviewed and updated as appropriate: allergies, current medications, past family history, past medical history, past social history, past surgical history and problem list.    ROS  Review of Systems   Constitution: Negative for chills, fatigue, fever, generalized weakness, weight gain and weight loss.   Cardiovascular: Negative for chest pain, claudication, dyspnea on exertion, leg swelling, orthopnea, palpitations, paroxysmal nocturnal dyspnea and syncope.   Respiratory: Negative for cough, shortness of breath, snoring, and wheezing.  HENT: Negative for ear pain, nosebleeds, and tinnitus.  Gastrointestinal: Negative for abdominal pain, constipation, diarrhea,  "nausea and vomiting.   Genitourinary: No urinary symptoms   Neurological: Negative for dizziness, headaches, loss of balance, numbness, and symptoms of stroke.  Psychiatric: Normal mental status.     All other systems reviewed and are negative.    Objective:     /68 (BP Location: Left arm, Patient Position: Sitting)   Pulse 107   Ht 185.4 cm (73\")   Wt 96.4 kg (212 lb 9.6 oz)   SpO2 98%   BMI 28.05 kg/m²          Physical Exam  Constitutional: Patient appears well-developed and well-nourished.   HENT: HEENT exam unremarkable.   Neck: Neck supple. No JVD present. No carotid bruits.   Cardiovascular: Normal rate, regular rhythm and normal heart sounds. No murmur heard.   2+ symmetric pulses.   Pulmonary/Chest: Breath sounds normal. Does not exhibit tenderness.   Abdominal: Abdomen benign.   Musculoskeletal: Does not exhibit edema.   Neurological: Neurological exam unremarkable.   Vitals reviewed.    Lab Review:   Lab Results   Component Value Date    GLUCOSE 220 (H) 05/09/2019    BUN 11 05/09/2019    CREATININE 0.93 05/09/2019    EGFRIFNONA 85 05/09/2019    BCR 11.8 05/09/2019    K 4.3 05/09/2019    CO2 24.0 05/09/2019    CALCIUM 8.9 05/09/2019    ALBUMIN 3.90 05/08/2019    AST 22 05/08/2019    ALT 22 05/08/2019     Lab Results   Component Value Date    CHOL 109 05/09/2019    TRIG 225 (H) 05/09/2019    HDL 32 (L) 05/09/2019    LDL 32 05/09/2019      Lab Results   Component Value Date    WBC 8.32 05/09/2019    HGB 13.8 05/09/2019    HCT 41.3 05/09/2019    MCV 83.1 05/09/2019     05/09/2019     Lab Results   Component Value Date    HGBA1C 11.90 (H) 05/09/2019       Procedures       Assessment:      Diagnosis Plan   1. Coronary artery disease involving coronary bypass graft of native heart without angina pectoris  Stable, continue aspirin and Plavix for DAPT. Continue all other current medications. Pt may return to work. Patient advised to increase vegetable and lean meat intake, and to decrease carb " intake.   2. Essential hypertension  Well-controlled, continue current medications.   3. Hyperlipidemia LDL goal <70  Well-controlled, continue rosuvastatin 40 mg.     Plan:   Patient is cleared to return to work from a cardiac standpoint.  Increase vegetable eat lean meat, avoid processed carbohydrates sweets/desserts and exercise regularly for better control of diabetes.   Is advised to discuss his poor diabetes control with PCP and consider seeing an endocrinologist.  It appears that his dry mouth may be related to diabetes as well.  Overall cardiac status is stable.  FU in 6 MO, sooner as needed.  Thank you for allowing us to participate in the care of your patient.     Scribed for Leo Hicks MD by Avis Alvares. 6/14/2019  1:21 PM      I, Leo Hicks MD, personally performed the services described in this documentation as scribed by the above named individual in my presence, and it is both accurate and complete.  6/14/2019  1:28 PM        Please note that portions of this note may have been completed with a voice recognition program. Efforts were made to edit the dictations, but occasionally words are mistranscribed.

## 2020-01-03 ENCOUNTER — OFFICE VISIT (OUTPATIENT)
Dept: CARDIOLOGY | Facility: CLINIC | Age: 55
End: 2020-01-03

## 2020-01-03 VITALS
BODY MASS INDEX: 29.69 KG/M2 | HEIGHT: 73 IN | DIASTOLIC BLOOD PRESSURE: 70 MMHG | WEIGHT: 224 LBS | SYSTOLIC BLOOD PRESSURE: 112 MMHG | HEART RATE: 87 BPM

## 2020-01-03 DIAGNOSIS — E78.5 HYPERLIPIDEMIA LDL GOAL <70: ICD-10-CM

## 2020-01-03 DIAGNOSIS — I10 ESSENTIAL HYPERTENSION: ICD-10-CM

## 2020-01-03 DIAGNOSIS — I25.810 CORONARY ARTERY DISEASE INVOLVING CORONARY BYPASS GRAFT OF NATIVE HEART WITHOUT ANGINA PECTORIS: Primary | ICD-10-CM

## 2020-01-03 PROCEDURE — 99214 OFFICE O/P EST MOD 30 MIN: CPT | Performed by: INTERNAL MEDICINE

## 2020-01-03 RX ORDER — ICOSAPENT ETHYL 1000 MG/1
2 CAPSULE ORAL 2 TIMES DAILY WITH MEALS
Qty: 360 CAPSULE | Refills: 3 | Status: SHIPPED | OUTPATIENT
Start: 2020-01-03 | End: 2021-02-25

## 2020-01-03 NOTE — PROGRESS NOTES
[facility]    Encounter Date:2020        Patient ID: Andry Beth is a 54 y.o. male.  : 1965     PCP: Kevin Silvestre DO     Chief Complaint: Coronary Artery Disease      PROBLEM LIST:  1. Coronary artery disease:  a. CABG x4, 2008: LIMA-LAD, SVG to diag and circ, SVG-RCA.  b. Stents to unknown vessel, 2010, [facility].  c. [facility], 2019: Severe 3-vessel disease of the native coronary arteries. Patent 3/4 bypass grafts (LIMA-LAD, sequential SVG to the diag and OM). Chronically occluded SVG to PDA. Successful PTCA/stenting of the distal circumflex into the left PDA reducing subtotal occlusion to no significant residual disease. Successful PTCA of the lateral jailed branch of the circumflex which maintained excellent patency. EF 60%.  d. 2 week Zio, 05/10/2019: Sinus rhythm, rare PAC's/PVC's.  2. Hypertension.  3. Hyperlipidemia.  4. DM2.  5. Syncope and collapse.  6. Depression.      History of Present Illness  Patient presents today for 6 month follow-up with a history of coronary artery disease and cardiac risk factors. Since last visit, he has been feeling well overall from a cardiovascular standpoint. He remains active with walking about one mile every other day. Patient denies chest pain, palpitations, shortness of breath, edema, dizziness, and syncope.  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    No Known Allergies      Current Outpatient Medications:   •  amitriptyline (ELAVIL) 100 MG tablet, Take 100 mg by mouth Every Night., Disp: , Rfl: 2  •  amLODIPine (NORVASC) 10 MG tablet, Take 5 mg by mouth Daily., Disp: , Rfl:   •  aspirin 325 MG tablet, Take 325 mg by mouth Daily., Disp: , Rfl:   •  chlorthalidone (HYGROTON) 25 MG tablet, Take 25 mg by mouth Daily., Disp: , Rfl:   •  colesevelam (WELCHOL) 625 MG tablet, Take 1,250 mg by mouth 2 (Two) Times a Day., Disp: , Rfl:   •  exenatide er (BYDUREON) 2 MG pen-injector  injection, Inject 2 mg under the skin into the appropriate area as directed 1 (One) Time Per Week., Disp: , Rfl:   •  FLUoxetine (PROzac) 40 MG capsule, Take 40 mg by mouth Daily., Disp: , Rfl:   •  glipiZIDE (GLUCOTROL) 10 MG tablet, Take 10 mg by mouth 2 (Two) Times a Day Before Meals., Disp: , Rfl:   •  JARDIANCE 25 MG tablet, Take 25 mg by mouth Daily., Disp: , Rfl: 2  •  Linagliptin-metFORMIN HCl (JENTADUETO) 2.5-1000 MG tablet, Take 1 tablet by mouth 2 (Two) Times a Day., Disp: , Rfl:   •  lisinopril (PRINIVIL,ZESTRIL) 40 MG tablet, Take 40 mg by mouth Daily., Disp: , Rfl:   •  metoprolol tartrate (LOPRESSOR) 25 MG tablet, Take 25 mg by mouth 2 (Two) Times a Day., Disp: , Rfl:   •  potassium chloride (K-DUR) 10 MEQ CR tablet, Take 10 mEq by mouth Daily., Disp: , Rfl:   •  rosuvastatin (CRESTOR) 40 MG tablet, Take 40 mg by mouth Daily., Disp: , Rfl:     The following portions of the patient's history were reviewed and updated as appropriate: allergies, current medications, past family history, past medical history, past social history, past surgical history and problem list.    ROS  Review of Systems   Constitution: Negative for chills, fatigue, fever, generalized weakness, weight gain and weight loss.   Cardiovascular: Negative for chest pain, claudication, dyspnea on exertion, leg swelling, orthopnea, palpitations, paroxysmal nocturnal dyspnea and syncope.   Respiratory: Negative for cough, shortness of breath, and wheezing.  HENT: Negative for ear pain, nosebleeds, and tinnitus.  Gastrointestinal: Negative for abdominal pain, constipation, diarrhea, nausea and vomiting.   Genitourinary: No urinary symptoms   Musculoskeletal: Negative for muscle cramps.  Neurological: Negative for dizziness, headaches, loss of balance, numbness, and symptoms of stroke.  Psychiatric: Normal mental status.     All other systems reviewed and are negative.    Objective:     /70 (BP Location: Left arm, Patient Position:  "Sitting)   Pulse 87   Ht 185.4 cm (73\")   Wt 102 kg (224 lb)   BMI 29.55 kg/m²        Physical Exam  Constitutional: Patient appears well-developed and well-nourished.   HENT: HEENT exam unremarkable.   Neck: Neck supple. No JVD present. No carotid bruits.   Cardiovascular: Normal rate, regular rhythm and normal heart sounds. No murmur heard.   2+ symmetric pulses.   Pulmonary/Chest: Breath sounds normal. Does not exhibit tenderness.   Abdominal: Abdomen benign.   Musculoskeletal: Does not exhibit edema.   Neurological: Neurological exam unremarkable.   Vitals reviewed.    Lab Review:   Lab Results   Component Value Date    GLUCOSE 220 (H) 05/09/2019    BUN 11 05/09/2019    CREATININE 0.93 05/09/2019    EGFRIFNONA 85 05/09/2019    BCR 11.8 05/09/2019    K 4.3 05/09/2019    CO2 24.0 05/09/2019    CALCIUM 8.9 05/09/2019    ALBUMIN 3.90 05/08/2019    AST 22 05/08/2019    ALT 22 05/08/2019     Lab Results   Component Value Date    CHOL 109 05/09/2019    TRIG 225 (H) 05/09/2019    HDL 32 (L) 05/09/2019    LDL 32 05/09/2019      Lab Results   Component Value Date    WBC 8.32 05/09/2019    HGB 13.8 05/09/2019    HCT 41.3 05/09/2019    MCV 83.1 05/09/2019     05/09/2019     Lab Results   Component Value Date    HGBA1C 11.90 (H) 05/09/2019        Procedures       Assessment:      Diagnosis Plan   1. Coronary artery disease involving coronary bypass graft of native heart without angina pectoris  Stable, continue current medications.  Schedule treadmill stress test to reassess CAD and overall heart function. Increase routine aerobic exercise to at least 30 minutes, 4-5 days per week.    2. Essential hypertension  Well-controlled, continue current medications   3. Hyperlipidemia LDL goal <70  LDL at goal; Triglycerides elevated. Start Vascepa 2 mg BID for hypertriglyceridemia.      Plan:   Schedule treadmill stress test to reassess CAD and overall cardiac status.    Increase routine aerobic exercise to at least 30 " minutes, 4-5 days per week.   Start Vascepa 2 mg BID for hypertriglyceridemia and additional coronary event risk reduction.   Continue all other current medications.   FU in 6 MO, sooner as needed.  Thank you for allowing us to participate in the care of your patient.     Scribed for Leo Hicks MD by Avis Alvares. 1/3/2020  8:58 AM       I, Leo Hicks MD, personally performed the services described in this documentation as scribed by the above named individual in my presence, and it is both accurate and complete.  1/3/2020  9:26 AM        Please note that portions of this note may have been completed with a voice recognition program. Efforts were made to edit the dictations, but occasionally words are mistranscribed.

## 2020-01-10 ENCOUNTER — APPOINTMENT (OUTPATIENT)
Dept: CARDIOLOGY | Facility: HOSPITAL | Age: 55
End: 2020-01-10

## 2021-01-13 ENCOUNTER — TELEPHONE (OUTPATIENT)
Dept: CARDIOLOGY | Facility: CLINIC | Age: 56
End: 2021-01-13

## 2021-01-13 DIAGNOSIS — I25.810 CORONARY ARTERY DISEASE INVOLVING CORONARY BYPASS GRAFT OF NATIVE HEART WITHOUT ANGINA PECTORIS: Primary | ICD-10-CM

## 2021-01-17 ENCOUNTER — APPOINTMENT (OUTPATIENT)
Dept: PREADMISSION TESTING | Facility: HOSPITAL | Age: 56
End: 2021-01-17

## 2021-01-17 PROCEDURE — C9803 HOPD COVID-19 SPEC COLLECT: HCPCS

## 2021-01-17 PROCEDURE — U0004 COV-19 TEST NON-CDC HGH THRU: HCPCS

## 2021-01-18 LAB — SARS-COV-2 RNA RESP QL NAA+PROBE: NOT DETECTED

## 2021-01-20 ENCOUNTER — HOSPITAL ENCOUNTER (OUTPATIENT)
Dept: CARDIOLOGY | Facility: HOSPITAL | Age: 56
Discharge: HOME | End: 2021-01-20

## 2021-01-20 VITALS — HEIGHT: 73 IN | WEIGHT: 215 LBS | BODY MASS INDEX: 28.49 KG/M2

## 2021-01-20 DIAGNOSIS — I25.810 CORONARY ARTERY DISEASE INVOLVING CORONARY BYPASS GRAFT OF NATIVE HEART WITHOUT ANGINA PECTORIS: ICD-10-CM

## 2021-01-20 PROCEDURE — 25010000002 REGADENOSON 0.4 MG/5ML SOLUTION: Performed by: INTERNAL MEDICINE

## 2021-01-20 PROCEDURE — 93018 CV STRESS TEST I&R ONLY: CPT | Performed by: INTERNAL MEDICINE

## 2021-01-20 PROCEDURE — 93017 CV STRESS TEST TRACING ONLY: CPT

## 2021-01-20 PROCEDURE — A9500 TC99M SESTAMIBI: HCPCS | Performed by: INTERNAL MEDICINE

## 2021-01-20 PROCEDURE — 0 TECHNETIUM SESTAMIBI: Performed by: INTERNAL MEDICINE

## 2021-01-20 PROCEDURE — 78452 HT MUSCLE IMAGE SPECT MULT: CPT

## 2021-01-20 PROCEDURE — 78452 HT MUSCLE IMAGE SPECT MULT: CPT | Performed by: INTERNAL MEDICINE

## 2021-01-20 RX ADMIN — TECHNETIUM TC 99M SESTAMIBI 1 DOSE: 1 INJECTION INTRAVENOUS at 10:52

## 2021-01-20 RX ADMIN — REGADENOSON 0.4 MG: 0.08 INJECTION, SOLUTION INTRAVENOUS at 10:40

## 2021-01-20 RX ADMIN — TECHNETIUM TC 99M SESTAMIBI 1 DOSE: 1 INJECTION INTRAVENOUS at 08:45

## 2021-01-21 LAB
BH CV STRESS BP STAGE 2: NORMAL
BH CV STRESS BP STAGE 4: NORMAL
BH CV STRESS COMMENTS STAGE 1: NORMAL
BH CV STRESS COMMENTS STAGE 2: NORMAL
BH CV STRESS DOSE REGADENOSON STAGE 1: 0.4
BH CV STRESS DURATION MIN STAGE 1: 1
BH CV STRESS DURATION MIN STAGE 2: 1
BH CV STRESS DURATION MIN STAGE 3: 1
BH CV STRESS DURATION MIN STAGE 4: 1
BH CV STRESS DURATION SEC STAGE 1: 0
BH CV STRESS DURATION SEC STAGE 2: 0
BH CV STRESS DURATION SEC STAGE 3: 0
BH CV STRESS DURATION SEC STAGE 4: 0
BH CV STRESS HR STAGE 1: 95
BH CV STRESS HR STAGE 2: 101
BH CV STRESS HR STAGE 3: 98
BH CV STRESS HR STAGE 4: 97
BH CV STRESS PROTOCOL 1: NORMAL
BH CV STRESS RECOVERY BP: NORMAL MMHG
BH CV STRESS RECOVERY HR: 93 BPM
BH CV STRESS STAGE 1: 1
BH CV STRESS STAGE 2: 2
BH CV STRESS STAGE 3: 3
BH CV STRESS STAGE 4: 4
LV EF NUC BP: 65 %
MAXIMAL PREDICTED HEART RATE: 165 BPM
PERCENT MAX PREDICTED HR: 61.21 %
STRESS BASELINE BP: NORMAL MMHG
STRESS BASELINE HR: 84 BPM
STRESS O2 SAT REST: 93 %
STRESS PERCENT HR: 72 %
STRESS POST PEAK BP: NORMAL MMHG
STRESS POST PEAK HR: 101 BPM
STRESS TARGET HR: 140 BPM

## 2021-01-25 ENCOUNTER — TELEPHONE (OUTPATIENT)
Dept: CARDIOLOGY | Facility: CLINIC | Age: 56
End: 2021-01-25

## 2021-01-25 NOTE — TELEPHONE ENCOUNTER
----- Message from Leo Hicks MD sent at 1/22/2021  8:19 AM EST -----  Please notify him that his stress test showed normal findings, no blockages noted, overall function of the heart is normal.  Continue same treatment and keep scheduled appointment for follow-up.

## 2021-02-24 PROBLEM — I20.0 UNSTABLE ANGINA: Status: RESOLVED | Noted: 2019-05-08 | Resolved: 2021-02-24

## 2021-02-24 PROBLEM — E87.6 HYPOKALEMIA: Status: RESOLVED | Noted: 2019-05-08 | Resolved: 2021-02-24

## 2021-02-24 NOTE — PROGRESS NOTES
Encounter Date:02/25/2021      Patient ID: Andry Beth is a 55 y.o. male.    Kevin Silvestre DO    Chief Complaint: Coronary Artery Disease and Post-op Open Heart Surgery      PROBLEM LIST:  Patient Active Problem List    Diagnosis Date Noted   • Syncope and collapse 05/08/2019     Priority: High   • CAD (coronary artery disease) 05/08/2019     Priority: High     Note Last Updated: 2/25/2021     · CABG x 4 (3-4-2008) LIMA to LAD, SVG diag and circ, SVG to RCA  · St[facility] to unknown vessels 2010 [facility]  · [facility] 5-9-19: Patent 3/4 bypass grafts (LIMA to the LAD, sequential vein graft to the diagonal and the obtuse marginal). Chronically occluded vein graft to the PDA, previously known finding. Successful PTCA/stenting of the distal circumflex into the left PDA reducing subtotal occlusion to no significant residual disease. Successful PTCA of the lateral jailed branch of the circumflex which maintained excellent patency. Normal left ventricular systolic function, estimated EF 60%.  · MPS 1-: Myocardial perfusion imaging indicates a normal myocardial perfusion study with no evidence of ischemia.  Left ventricular ejection fraction is normal. (Calculated EF = 65%).       • Frequent unifocal PVCs 05/08/2019     Priority: High   • Essential hypertension 05/08/2019     Priority: Medium   • Hyperlipidemia LDL goal <70 05/08/2019     Priority: Medium   • DM (diabetes mellitus), type 2 (CMS/[facility]) 05/08/2019     Priority: Low   • Depression 05/08/2019               History of Present Illness  Patient presents today for follow-up with a history of CAD with CABG, PVCs, hypertension dyslipidemia.  He returns today for scheduled follow-up.  He has no current complaint exertional chest pain or dyspnea and orthopnea no PND no claudication or lower extremity edema.  He has no awareness of tachyarrhythmias, no dizziness or syncope.  States his blood pressure typically runs less than 130 mmHg.  He states he had a recent  "lipid panel through primary care that was reported to be \"borderline\".  He states compliance with the current medical regimen reports no significant adverse side effects.  He is complaining of a \"skin tag\" in the right groin which primary care expressed reluctance to remove secondary to him being on blood thinners.  He states it is often pustular and has grown significantly in the last 6 months.  He has a much smaller but similar lesion in the left clavicular area.    No Known Allergies    Current Outpatient Medications   Medication Instructions   • Accu-Chek Cristina Plus test strip USE TO TEST BLOOD GLUCOSE THREE TIMES DAILY   • amitriptyline (ELAVIL) 75 mg, Oral, Every Night at Bedtime   • amLODIPine (NORVASC) 5 mg, Oral, Daily   • aspirin 325 mg, Oral, Daily   • B-D UF III MINI PEN NEEDLES 31G X 5 MM misc 2 Times Daily, as directed   • chlorthalidone (HYGROTON) 25 mg, Oral, Daily   • clopidogrel (PLAVIX) 75 mg, Oral, Daily   • colesevelam (WELCHOL) 1,250 mg, Oral, 2 Times Daily   • FLUoxetine (PROZAC) 40 mg, Oral, Daily   • glipizide (GLUCOTROL) 10 mg, Oral, 2 Times Daily Before Meals   • Insulin Lispro Prot & Lispro (humaLOG 75-25) (75-25) 100 UNIT/ML suspension pen-injector pen No dose, route, or frequency recorded.   • Linagliptin-metFORMIN HCl (JENTADUETO) 2.5-1000 MG tablet 1 tablet, Oral, 2 Times Daily   • lisinopril (PRINIVIL,ZESTRIL) 40 mg, Oral, Daily   • metoprolol tartrate (LOPRESSOR) 25 mg, Oral, 2 Times Daily   • Ozempic, 0.25 or 0.5 MG/DOSE, 2 MG/1.5ML solution pen-injector INJECT 0.25 MG ONCE A WEEK FOR 4 WEEKS THEN INJECT 0.5 MG ONCE A WEEK. DISCARD EACH PEN AFTER 56 DAYS AND REFILL.   • potassium chloride (K-DUR) 10 MEQ CR tablet 10 mEq, Oral, Daily   • rosuvastatin (CRESTOR) 40 mg, Oral, Daily       The following portions of the patient's history were reviewed and updated as appropriate: allergies, current medications, past family history, past medical history, past social history, past surgical " "history and problem list.  .    Objective:     /70   Pulse 95   Ht 185.4 cm (72.99\")   Wt 104 kg (230 lb)   SpO2 97%   BMI 30.35 kg/m²    Body mass index is 30.35 kg/m².     Constitutional:       Appearance: Well-developed.   Pulmonary:      Effort: Pulmonary effort is normal. No respiratory distress.      Breath sounds: Normal breath sounds. No wheezing. No rales.      Comments: Bases clear  Chest:      Chest wall: Not tender to palpatation.   Cardiovascular:      Normal rate. Regular rhythm.      Murmurs: There is no murmur.      No gallop. No click. No rub.   Pulses:     Intact distal pulses.   Musculoskeletal: Normal range of motion.   Skin:             Lab Review:                     Lab Results   Component Value Date    HGBA1C 11.90 (H) 05/09/2019     Lab Results   Component Value Date    CHOL 109 05/09/2019     Lab Results   Component Value Date    TRIG 225 (H) 05/09/2019     Lab Results   Component Value Date    HDL 32 (L) 05/09/2019     Lab Results   Component Value Date    LDL 32 05/09/2019         Procedures             Assessment:      Diagnosis Plan   1. Coronary artery disease involving coronary bypass graft of native heart without angina pectoris   stable without current anginal symptoms, continue current medical regimen   2. Essential hypertension   well managed on current medical regimen   3. Hyperlipidemia LDL goal <70   tolerating statin therapy.  Recent lipid panel from primary care reported to be \"borderline\".  We will request a copy of his most recent study for review.   4. Skin tumor   referral to dermatology soonest possible appointment.     Plan:     Stable cardiac status.  Continue current medications.   in 1 year, sooner as needed.  Thank you for allowing us to participate in the care of your patient.     Electronically signed by MARÍA ELENA Houston, 02/25/21, 10:27 AM EST.      Please note that portions of this note may have been completed with a voice recognition program. " Efforts were made to edit the dictations, but occasionally words are mis-translated.

## 2021-02-25 ENCOUNTER — OFFICE VISIT (OUTPATIENT)
Dept: CARDIOLOGY | Facility: CLINIC | Age: 56
End: 2021-02-25

## 2021-02-25 VITALS
WEIGHT: 230 LBS | HEART RATE: 95 BPM | BODY MASS INDEX: 30.48 KG/M2 | SYSTOLIC BLOOD PRESSURE: 126 MMHG | DIASTOLIC BLOOD PRESSURE: 70 MMHG | HEIGHT: 73 IN | OXYGEN SATURATION: 97 %

## 2021-02-25 DIAGNOSIS — E78.5 HYPERLIPIDEMIA LDL GOAL <70: ICD-10-CM

## 2021-02-25 DIAGNOSIS — I10 ESSENTIAL HYPERTENSION: ICD-10-CM

## 2021-02-25 DIAGNOSIS — D49.2 SKIN TUMOR: ICD-10-CM

## 2021-02-25 DIAGNOSIS — I25.810 CORONARY ARTERY DISEASE INVOLVING CORONARY BYPASS GRAFT OF NATIVE HEART WITHOUT ANGINA PECTORIS: Primary | ICD-10-CM

## 2021-02-25 PROCEDURE — 99214 OFFICE O/P EST MOD 30 MIN: CPT | Performed by: PHYSICIAN ASSISTANT

## 2021-02-25 RX ORDER — FLURBIPROFEN SODIUM 0.3 MG/ML
SOLUTION/ DROPS OPHTHALMIC 2 TIMES DAILY
COMMUNITY
Start: 2021-01-22

## 2021-02-25 RX ORDER — AMITRIPTYLINE HYDROCHLORIDE 75 MG/1
75 TABLET, FILM COATED ORAL
COMMUNITY
Start: 2021-01-06

## 2021-02-25 RX ORDER — SEMAGLUTIDE 1.34 MG/ML
INJECTION, SOLUTION SUBCUTANEOUS
COMMUNITY
Start: 2021-02-17

## 2021-02-25 RX ORDER — METFORMIN HYDROCHLORIDE 500 MG/1
1000 TABLET, EXTENDED RELEASE ORAL 2 TIMES DAILY
COMMUNITY
Start: 2021-01-27 | End: 2021-02-25

## 2021-02-25 RX ORDER — BLOOD SUGAR DIAGNOSTIC
STRIP MISCELLANEOUS
COMMUNITY
Start: 2021-01-09

## 2021-02-25 RX ORDER — INSULIN LISPRO 100 [IU]/ML
INJECTION, SUSPENSION SUBCUTANEOUS 2 TIMES DAILY WITH MEALS
COMMUNITY
Start: 2021-01-09

## 2021-02-25 RX ORDER — CLOPIDOGREL BISULFATE 75 MG/1
75 TABLET ORAL DAILY
COMMUNITY
Start: 2021-01-06 | End: 2022-03-04 | Stop reason: SDUPTHER

## 2021-02-25 RX ORDER — AMLODIPINE BESYLATE 5 MG/1
5 TABLET ORAL DAILY
COMMUNITY
Start: 2021-01-06

## 2021-03-09 ENCOUNTER — TELEPHONE (OUTPATIENT)
Dept: CARDIOLOGY | Facility: CLINIC | Age: 56
End: 2021-03-09

## 2021-03-09 NOTE — TELEPHONE ENCOUNTER
Referral placed to [facility] for 9:45 Friday 3/12. Attempted to call pt to notify, no answer. LVM w information and provided call back number for further questions or concerns.       [facility]  [address] [place] [address] [id number], [place], [place] [address]

## 2022-03-03 NOTE — PROGRESS NOTES
[facility] Cardiology    Encounter Date: 2022    Patient ID: Andry Beth is a 56 y.o. male.  : 1965     PCP: Kevin Silvestre DO       Chief Complaint: Coronary artery disease involving coronary bypass graft of n      PROBLEM LIST:  1. Coronary artery disease:  a. CABG x4, 2008: LIMA-LAD, SVG to diag and circ, SVG-RCA.  b. Stents to unknown vessel, 2010, [facility].  c. [facility], 2019: Severe 3-vessel disease of the native coronary arteries. Patent 3/4 bypass grafts (LIMA-LAD, sequential SVG to the diag and OM). Chronically occluded SVG to PDA. Successful PTCA/stenting of the distal circumflex into the left PDA reducing subtotal occlusion to no significant residual disease. Successful PTCA of the lateral jailed branch of the circumflex which maintained excellent patency. EF 60%.  d. 2 week Zio, 05/10/2019: Sinus rhythm, rare PAC's/PVC's.  e. MPS, 2021: EF 65%. No evidence of ischemia,   2. Syncope  3. Frequent PVCs.  4. Hypertension.  5. Hyperlipidemia.  6. DM2.  7. Syncope and collapse.  8. Depression.    History of Present Illness  Patient presents today for an annual follow-up with a history of coronary artery disease and cardiac risk factors. Since last visit, the patient has been doing well overall from a cardiovascular standpoint. His PCP monitors his laboratory studies and are drawn every 3 months. His last HbA1c was 9.8 but he recently transitioned to a new physician for this. He is trying to improve his diet. He is not entirely sure why he is on a diuretic and potassium supplement and denies every having issues with fluid retention. Patient denies chest pain, shortness of breath, palpitations, edema, dizziness, and syncope.       No Known Allergies      Current Outpatient Medications:   •  Accu-Chek Cristina Plus test strip, USE TO TEST BLOOD GLUCOSE THREE TIMES DAILY, Disp: , Rfl:   •  amitriptyline (ELAVIL) 75 MG tablet, Take 75 mg by mouth every night  at bedtime., Disp: , Rfl:   •  amLODIPine (NORVASC) 5 MG tablet, Take 5 mg by mouth Daily., Disp: , Rfl:   •  aspirin 81 MG EC tablet, Take 81 mg by mouth Daily., Disp: , Rfl:   •  B-D UF III MINI PEN NEEDLES 31G X 5 MM misc, 2 (Two) Times a Day. as directed, Disp: , Rfl:   •  chlorthalidone (HYGROTON) 25 MG tablet, Take 25 mg by mouth Daily., Disp: , Rfl:   •  clopidogrel (PLAVIX) 75 MG tablet, Take 75 mg by mouth Daily., Disp: , Rfl:   •  colesevelam (WELCHOL) 625 MG tablet, Take 1,875 mg by mouth 2 (Two) Times a Day., Disp: , Rfl:   •  FLUoxetine (PROzac) 40 MG capsule, Take 40 mg by mouth Daily., Disp: , Rfl:   •  Insulin Lispro Prot & Lispro (humaLOG 75-25) (75-25) 100 UNIT/ML suspension pen-injector pen, 44 Units 2 (Two) Times a Day With Meals. 44 units in the am, 33 units in the pm., Disp: , Rfl:   •  lisinopril (PRINIVIL,ZESTRIL) 40 MG tablet, Take 40 mg by mouth Daily., Disp: , Rfl:   •  metFORMIN (GLUCOPHAGE) 500 MG tablet, Take 1,000 mg by mouth 2 (Two) Times a Day With Meals., Disp: , Rfl:   •  metoprolol tartrate (LOPRESSOR) 25 MG tablet, Take 25 mg by mouth 2 (Two) Times a Day., Disp: , Rfl:   •  Ozempic, 0.25 or 0.5 MG/DOSE, 2 MG/1.5ML solution pen-injector, INJECT 0.25 MG ONCE A WEEK FOR 4 WEEKS THEN INJECT 0.5 MG ONCE A WEEK. DISCARD EACH PEN AFTER 56 DAYS AND REFILL., Disp: , Rfl:   •  potassium chloride (K-DUR) 10 MEQ CR tablet, Take 10 mEq by mouth Daily., Disp: , Rfl:   •  rosuvastatin (CRESTOR) 40 MG tablet, Take 40 mg by mouth Daily., Disp: , Rfl:     The following portions of the patient's history were reviewed and updated as appropriate: allergies, current medications, past family history, past medical history, past social history, past surgical history and problem list.    ROS  Review of Systems   Constitution: Negative for chills, fever, fatigue, generalized weakness.   Cardiovascular: Negative for chest pain, dyspnea on exertion, leg swelling, palpitations, orthopnea, and syncope.  "  Respiratory: Negative for cough, shortness of breath, and wheezing.  HENT: Negative for ear pain, nosebleeds, and tinnitus.  Gastrointestinal: Negative for abdominal pain, constipation, diarrhea, nausea and vomiting.   Genitourinary: No urinary symptoms.  Musculoskeletal: Negative for muscle cramps.  Neurological: Negative for dizziness, headaches, loss of balance, numbness, and symptoms of stroke.  Psychiatric: Normal mental status.     All other systems reviewed and are negative.        Objective:     /82 (BP Location: Right arm, Patient Position: Sitting)   Pulse 87   Ht 185.4 cm (73\")   Wt 107 kg (236 lb)   SpO2 98%   BMI 31.14 kg/m²      Physical Exam  Constitutional: Patient appears well-developed and well-nourished.   HENT: HEENT exam unremarkable.   Neck: Neck supple. No JVD present. No carotid bruits.   Cardiovascular: Normal rate, regular rhythm and normal heart sounds. No murmur heard.   2+ symmetric pulses.   Pulmonary/Chest: Breath sounds normal. Does not exhibit tenderness.   Abdominal: Abdomen benign.   Musculoskeletal: Does not exhibit edema.   Neurological: Neurological exam unremarkable.   Vitals reviewed.    Data Review:        ECG 12 Lead    Date/Time: 3/4/2022 10:10 AM  Performed by: Leo Hicks MD  Authorized by: Leo Hciks MD   Comparison: compared with previous ECG from 5/16/2019  Similar to previous ECG  Rhythm: sinus rhythm  BPM: 87    Clinical impression: normal ECG               Assessment:      Diagnosis Plan   1. Coronary artery disease involving coronary bypass graft of native heart without angina pectoris  Stable without angina; pt may decrease aspirin from 325 mg to 81 mg daily and continue Plavix 75 mg for DAPT   2. Essential hypertension  Well controlled; continue amlodipine 5 mg, lisinopril 40 mg, and metoprolol 25 mg. Pt unsure why he is on chlorthalidone and potassium, recommend he ask PCP why this is    3. Hyperlipidemia LDL goal <70  Monitored by PCP; " continue rosuvastatin 40 mg      Plan:   Stable cardiac status.  Patient should ask PCP why he is on chlorthalidone and potassium. He does not have history of fluid retention and diuretics could be replaced with increase in current antihypertensives with less risk of side effects specially in the setting of diabetes.   Encouraged heart healthy diet and regular exercise.   Continue current medications.   FU in 12 MO, sooner as needed.  Thank you for allowing us to participate in the care of your patient.     Scribed for Leo Hicks MD by Sherrill Friend. 3/4/2022 10:04 EST        I, Leo Hicks MD, personally performed the services described in this documentation as scribed by the above named individual in my presence, and it is both accurate and complete.  3/4/2022  14:39 EST        Part of this note may be an electronic transcription/translation of spoken language to printed text using the Dragon Dictation System.

## 2022-03-04 ENCOUNTER — OFFICE VISIT (OUTPATIENT)
Dept: CARDIOLOGY | Facility: CLINIC | Age: 57
End: 2022-03-04

## 2022-03-04 VITALS
OXYGEN SATURATION: 98 % | HEIGHT: 73 IN | HEART RATE: 87 BPM | DIASTOLIC BLOOD PRESSURE: 82 MMHG | BODY MASS INDEX: 31.28 KG/M2 | WEIGHT: 236 LBS | SYSTOLIC BLOOD PRESSURE: 136 MMHG

## 2022-03-04 DIAGNOSIS — I25.810 CORONARY ARTERY DISEASE INVOLVING CORONARY BYPASS GRAFT OF NATIVE HEART WITHOUT ANGINA PECTORIS: Primary | ICD-10-CM

## 2022-03-04 DIAGNOSIS — I10 ESSENTIAL HYPERTENSION: ICD-10-CM

## 2022-03-04 DIAGNOSIS — E78.5 HYPERLIPIDEMIA LDL GOAL <70: ICD-10-CM

## 2022-03-04 PROCEDURE — 99214 OFFICE O/P EST MOD 30 MIN: CPT | Performed by: INTERNAL MEDICINE

## 2022-03-04 RX ORDER — ASPIRIN 81 MG/1
81 TABLET ORAL DAILY
COMMUNITY
End: 2023-04-02 | Stop reason: ALTCHOICE

## 2022-03-04 RX ORDER — CLOPIDOGREL BISULFATE 75 MG/1
75 TABLET ORAL DAILY
Qty: 90 TABLET | Refills: 3 | Status: SHIPPED | OUTPATIENT
Start: 2022-03-04

## 2022-12-13 ENCOUNTER — TELEPHONE (OUTPATIENT)
Dept: CARDIOLOGY | Facility: CLINIC | Age: 57
End: 2022-12-13

## 2022-12-13 DIAGNOSIS — I25.810 CORONARY ARTERY DISEASE INVOLVING CORONARY BYPASS GRAFT OF NATIVE HEART WITHOUT ANGINA PECTORIS: Primary | ICD-10-CM

## 2022-12-13 DIAGNOSIS — Z02.89 ENCOUNTER FOR EXAMINATION REQUIRED BY DEPARTMENT OF TRANSPORTATION (DOT): ICD-10-CM

## 2022-12-13 NOTE — TELEPHONE ENCOUNTER
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  Stress test order put in.

## 2022-12-27 ENCOUNTER — HOSPITAL ENCOUNTER (OUTPATIENT)
Dept: CARDIOLOGY | Facility: HOSPITAL | Age: 57
Discharge: HOME | End: 2022-12-27
Admitting: INTERNAL MEDICINE

## 2022-12-27 VITALS
DIASTOLIC BLOOD PRESSURE: 78 MMHG | WEIGHT: 236 LBS | HEIGHT: 73 IN | SYSTOLIC BLOOD PRESSURE: 130 MMHG | BODY MASS INDEX: 31.28 KG/M2 | HEART RATE: 98 BPM

## 2022-12-27 DIAGNOSIS — I25.810 CORONARY ARTERY DISEASE INVOLVING CORONARY BYPASS GRAFT OF NATIVE HEART WITHOUT ANGINA PECTORIS: ICD-10-CM

## 2022-12-27 DIAGNOSIS — Z02.89 ENCOUNTER FOR EXAMINATION REQUIRED BY DEPARTMENT OF TRANSPORTATION (DOT): ICD-10-CM

## 2022-12-27 PROCEDURE — 93018 CV STRESS TEST I&R ONLY: CPT | Performed by: INTERNAL MEDICINE

## 2022-12-27 PROCEDURE — 93017 CV STRESS TEST TRACING ONLY: CPT

## 2023-01-04 LAB
BH CV STRESS BP STAGE 1: NORMAL
BH CV STRESS BP STAGE 2: NORMAL
BH CV STRESS DURATION MIN STAGE 1: 3
BH CV STRESS DURATION MIN STAGE 2: 3
BH CV STRESS DURATION MIN STAGE 3: 1
BH CV STRESS DURATION SEC STAGE 1: 0
BH CV STRESS DURATION SEC STAGE 2: 0
BH CV STRESS DURATION SEC STAGE 3: 1
BH CV STRESS GRADE STAGE 1: 10
BH CV STRESS GRADE STAGE 2: 12
BH CV STRESS GRADE STAGE 3: 14
BH CV STRESS HR STAGE 1: 131
BH CV STRESS HR STAGE 2: 150
BH CV STRESS HR STAGE 3: 160
BH CV STRESS METS STAGE 1: 5
BH CV STRESS METS STAGE 2: 7.5
BH CV STRESS METS STAGE 3: 10
BH CV STRESS O2 STAGE 1: 97
BH CV STRESS O2 STAGE 2: 98
BH CV STRESS O2 STAGE 3: 96
BH CV STRESS PROTOCOL 1: NORMAL
BH CV STRESS RECOVERY BP: NORMAL MMHG
BH CV STRESS RECOVERY HR: 162 BPM
BH CV STRESS RECOVERY O2: 99 %
BH CV STRESS SPEED STAGE 1: 1.7
BH CV STRESS SPEED STAGE 2: 2.5
BH CV STRESS SPEED STAGE 3: 3.4
BH CV STRESS STAGE 1: 1
BH CV STRESS STAGE 2: 2
BH CV STRESS STAGE 3: 3
MAXIMAL PREDICTED HEART RATE: 163 BPM
PERCENT MAX PREDICTED HR: 99.39 %
STRESS BASELINE BP: NORMAL MMHG
STRESS BASELINE HR: 98 BPM
STRESS O2 SAT REST: 98 %
STRESS PERCENT HR: 117 %
STRESS POST ESTIMATED WORKLOAD: 8.5 METS
STRESS POST EXERCISE DUR MIN: 7 MIN
STRESS POST EXERCISE DUR SEC: 1 SEC
STRESS POST O2 SAT PEAK: 96 %
STRESS POST PEAK BP: NORMAL MMHG
STRESS POST PEAK HR: 162 BPM
STRESS TARGET HR: 139 BPM

## 2023-03-31 ENCOUNTER — OFFICE VISIT (OUTPATIENT)
Dept: CARDIOLOGY | Facility: CLINIC | Age: 58
End: 2023-03-31
Payer: COMMERCIAL

## 2023-03-31 VITALS
OXYGEN SATURATION: 96 % | HEIGHT: 73 IN | DIASTOLIC BLOOD PRESSURE: 66 MMHG | HEART RATE: 100 BPM | WEIGHT: 217 LBS | SYSTOLIC BLOOD PRESSURE: 106 MMHG | BODY MASS INDEX: 28.76 KG/M2

## 2023-03-31 DIAGNOSIS — E78.5 HYPERLIPIDEMIA LDL GOAL <70: ICD-10-CM

## 2023-03-31 DIAGNOSIS — I10 ESSENTIAL HYPERTENSION: ICD-10-CM

## 2023-03-31 DIAGNOSIS — I25.810 CORONARY ARTERY DISEASE INVOLVING CORONARY BYPASS GRAFT OF NATIVE HEART WITHOUT ANGINA PECTORIS: Primary | ICD-10-CM

## 2023-03-31 PROCEDURE — 93000 ELECTROCARDIOGRAM COMPLETE: CPT | Performed by: INTERNAL MEDICINE

## 2023-03-31 PROCEDURE — 99214 OFFICE O/P EST MOD 30 MIN: CPT | Performed by: INTERNAL MEDICINE

## 2023-03-31 RX ORDER — BLOOD-GLUCOSE SENSOR
EACH MISCELLANEOUS
COMMUNITY
Start: 2023-03-13

## 2023-03-31 NOTE — PROGRESS NOTES
[facility] Cardiology    Encounter Date: 2023    Patient ID: Andry Beth is a 57 y.o. male.  : 1965     PCP: Kevin Silvestre DO       Chief Complaint: Coronary Artery Disease      PROBLEM LIST:  1. Coronary artery disease:  a. CABG x4, 2008: LIMA-LAD, SVG to diag and circ, SVG-RCA.  b. Stents to unknown vessel, , [facility].  c. [facility], 2019: Severe 3-vessel disease of the native coronary arteries. Patent 3/4 bypass grafts (LIMA-LAD, sequential SVG to the diag and OM). Chronically occluded SVG to PDA. Successful PTCA/stenting of the distal circumflex into the left PDA reducing subtotal occlusion to no significant residual disease. Successful PTCA of the lateral jailed branch of the circumflex which maintained excellent patency. EF 60%.  d. 2w Zio, 05/10/2019: Sinus rhythm, rare PAC's/PVC's.  e. MPS, 2021: EF 65%. No evidence of ischemia.  f. GXT, 2022: Average exercise capacity with normal hemodynamic response to exercise. Resting heart rate 97, blood pressure 130/78.  Peak heart rate 162, blood pressure 184/80.  99% of age-predicted maximal heart rate was achieved.  THR  138 bpm  Achieved at 4;16 minutes. Expected exercise time 9:20  Actual time 7:01  DESHAUN  +25  Patient requested to stop d/t SOA. The test is negative for anginal chest pain or diagnostic ST segment changes at moderate workload and target heart rate.  2. Syncope  3. Frequent PVCs.  4. Hypertension.  5. Hyperlipidemia.  6. DM2.  7. Syncope and collapse.  8. Depression.    History of Present Illness  Patient presents today for a follow-up with a history of CAD and cardiac risk factors. Since last visit, patient has been doing well overall from a cardiovascular standpoint. He remains busy by working as a  around the [employer]. Patient has had no interim ER visits, hospitalizations, serious illnesses, or injuries. He also reports that he has not needed to take  nitroglycerin for chest pain. Patient had labs done 2-3 weeks ago in [place]. He states that his cholesterol levels were normal and his HA1c has decreased from 11 to 8.8. He was previously taking Jardiance, however, this caused adverse symptoms of myalgia so he initiated alternative therapy. He now takes Ozempic to manage his diabetes mellitus. Patient denies chest pain, shortness of breath, orthopnea, palpitations, edema, dizziness, and syncope.        No Known Allergies      Current Outpatient Medications:   •  Accu-Chek Cristina Plus test strip, USE TO TEST BLOOD GLUCOSE THREE TIMES DAILY, Disp: , Rfl:   •  amitriptyline (ELAVIL) 75 MG tablet, Take 1 tablet by mouth every night at bedtime., Disp: , Rfl:   •  amLODIPine (NORVASC) 5 MG tablet, Take 1 tablet by mouth Daily., Disp: , Rfl:   •  B-D UF III MINI PEN NEEDLES 31G X 5 MM misc, 2 (Two) Times a Day. as directed, Disp: , Rfl:   •  chlorthalidone (HYGROTON) 25 MG tablet, Take 1 tablet by mouth Daily., Disp: , Rfl:   •  clopidogrel (PLAVIX) 75 MG tablet, Take 1 tablet by mouth Daily., Disp: 90 tablet, Rfl: 3  •  colesevelam (WELCHOL) 625 MG tablet, Take 3 tablets by mouth 2 (Two) Times a Day., Disp: , Rfl:   •  FLUoxetine (PROzac) 40 MG capsule, Take 1 capsule by mouth Daily., Disp: , Rfl:   •  Insulin Lispro Prot & Lispro (humaLOG 75-25) (75-25) 100 UNIT/ML suspension pen-injector pen, 2 (Two) Times a Day With Meals. Sliding scale twice daily, Disp: , Rfl:   •  lisinopril (PRINIVIL,ZESTRIL) 40 MG tablet, Take 1 tablet by mouth Daily., Disp: , Rfl:   •  metFORMIN (GLUCOPHAGE) 500 MG tablet, Take 2 tablets by mouth 2 (Two) Times a Day With Meals., Disp: , Rfl:   •  metoprolol tartrate (LOPRESSOR) 25 MG tablet, Take 1 tablet by mouth 2 (Two) Times a Day., Disp: , Rfl:   •  Ozempic, 0.25 or 0.5 MG/DOSE, 2 MG/1.5ML solution pen-injector, INJECT 0.25 MG ONCE A WEEK FOR 4 WEEKS THEN INJECT 0.5 MG ONCE A WEEK. DISCARD EACH PEN AFTER 56 DAYS AND REFILL., Disp: , Rfl:  "  •  potassium chloride (K-DUR) 10 MEQ CR tablet, Take 1 tablet by mouth Daily., Disp: , Rfl:   •  rosuvastatin (CRESTOR) 40 MG tablet, Take 1 tablet by mouth Daily., Disp: , Rfl:   •  Continuous Blood Gluc Sensor (FreeStyle Gricelda 3 Sensor) misc, , Disp: , Rfl:     The following portions of the patient's history were reviewed and updated as appropriate: allergies, current medications, past family history, past medical history, past social history, past surgical history and problem list.    ROS  Review of Systems   14 point ROS negative except for that listed in the HPI.         Objective:     /66 (BP Location: Right arm, Patient Position: Sitting)   Pulse 100   Ht 185.4 cm (73\")   Wt 98.4 kg (217 lb)   SpO2 96%   BMI 28.63 kg/m²      Physical Exam  Constitutional: Patient appears well-developed and well-nourished.   HENT: HEENT exam unremarkable.   Neck: Neck supple. No JVD present. No carotid bruits.   Cardiovascular: Normal rate, regular rhythm and normal heart sounds. No murmur heard.   2+ symmetric pulses.   Pulmonary/Chest: Breath sounds normal. Does not exhibit tenderness.   Abdominal: Abdomen benign.   Musculoskeletal: Does not exhibit edema.   Neurological: Neurological exam unremarkable.   Vitals reviewed.    Data Review:   Lab Results   Component Value Date    CHOL 109 05/09/2019    TRIG 225 (H) 05/09/2019    HDL 32 (L) 05/09/2019    LDL 32 05/09/2019      Lab Results   Component Value Date    HGBA1C 10.1 (A) 03/01/2022          ECG 12 Lead    Date/Time: 3/31/2023 10:54 AM  Performed by: Leo Hicks MD  Authorized by: Leo Hicks MD   Comparison: compared with previous ECG from 3/4/2022  Comparison to previous ECG: Nonspecific ST changes  Rhythm: sinus rhythm  BPM: 98  Other findings: non-specific ST-T wave changes and left ventricular hypertrophy    Clinical impression: abnormal EKG  Comments: Minimal voltage criteria for LVH, may be normal variant             Assessment:      Diagnosis " Plan   1. Coronary artery disease involving coronary bypass graft of native heart without angina pectoris  Stable without angina on current activity. Continue on Plavix for antiplatelet therapy.  Continue rest of the current GDMT.   2. Essential hypertension  Well controlled. Continue on amlodipine, chlorthalidone metoprolol, and lisinopril for hypertension.    3. Hyperlipidemia LDL goal <70  Our office will obtain recent labs done to review cholesterol levels. Continue on rosuvastatin for hyperlipidemia.  Dietary changes and regular exercise recommended for better management of increased triglycerides     Plan:   Stable cardiac status. No current angina or CHF symptoms.  Our office will obtain recent labs done to review cholesterol levels.   All the diet and regular exercise recommended.  Continue current medications.   FU in 12 MO, sooner as needed.  Thank you for allowing us to participate in the care of your patient.     Scribed for Leo Hicks MD by Kayla Soler. 4/2/2023 10:47 EDT    I, Leo Hicks MD, personally performed the services described in this documentation as scribed by the above named individual in my presence, and it is both accurate and complete.  4/2/2023  10:36 EDT      Please note that portions of this note may have been completed with a voice recognition program. Efforts were made to edit the dictations, but occasionally words are mistranscribed.

## 2024-10-03 NOTE — PROGRESS NOTES
[facility] Cardiology    Encounter Date: 10/04/2024    Patient ID: Andry Beth is a 59 y.o. male.  : 1965     PCP: Kevin Silvestre DO       Chief Complaint: Coronary artery disease involving coronary bypass graft of       PROBLEM LIST:  Coronary artery disease:  CABG x4, 2008: LIMA-LAD, SVG to diag and circ, SVG-RCA.  Stents to unknown vessel, 2010, [place].  [facility], 2019: Severe 3-vessel disease of the native coronary arteries. Patent 3/4 bypass grafts (LIMA-LAD, sequential SVG to the diag and OM). Chronically occluded SVG to PDA. Successful PTCA/stenting of the distal circumflex into the left PDA reducing subtotal occlusion to no significant residual disease. Successful PTCA of the lateral jailed branch of the circumflex which maintained excellent patency. EF 60%.  2w Zio, 05/10/2019: Sinus rhythm, rare PAC's/PVC's.  MPS, 2021: EF 65%. No evidence of ischemia.  GXT, 2022: Average exercise capacity with normal hemodynamic response to exercise. Resting heart rate 97, blood pressure 130/78.  Peak heart rate 162, blood pressure 184/80.  99% of age-predicted maximal heart rate was achieved.  THR  138 bpm  Achieved at 4;16 minutes. Expected exercise time 9:20  Actual time 7:01  DESHAUN  +25  Patient requested to stop d/t SOA. The test is negative for anginal chest pain or diagnostic ST segment changes at moderate workload and target heart rate.  Syncope  Frequent PVCs.  Hypertension.  Hyperlipidemia.  DM2.  Syncope and collapse.  Depression.    History of Present Illness  Patient presents today for a follow-up with a history of CAD and cardiac risk factors. Since last visit, Patient has been doing well from a cardiac standpoint.  He works as a  so he does not get regular physical activity. He does tolerated his regular daily activities well. Has had his medications changed by endocrinology. States his BP is usually in the 110s-120s but believes it  is elevated due to the pain in his back today. Patient denies any chest pain, shortness of air, palpitations, orthopnea, edema, presyncope or syncope. He has been out of his plavix for several months but states he has been taking a 325 aspirin.         No Known Allergies      Current Outpatient Medications:     Accu-Chek Cristina Plus test strip, USE TO TEST BLOOD GLUCOSE THREE TIMES DAILY, Disp: , Rfl:     amitriptyline (ELAVIL) 75 MG tablet, Take 1 tablet by mouth every night at bedtime., Disp: , Rfl:     amLODIPine (NORVASC) 5 MG tablet, Take 1 tablet by mouth Daily., Disp: , Rfl:     B-D UF III MINI PEN NEEDLES 31G X 5 MM misc, 2 (Two) Times a Day. as directed, Disp: , Rfl:     chlorthalidone (HYGROTON) 25 MG tablet, Take 1 tablet by mouth Daily., Disp: , Rfl:     clopidogrel (PLAVIX) 75 MG tablet, Take 1 tablet by mouth Daily., Disp: 90 tablet, Rfl: 3    colesevelam (WELCHOL) 625 MG tablet, Take 3 tablets by mouth 2 (Two) Times a Day., Disp: , Rfl:     Continuous Blood Gluc Sensor (FreeStyle Gricelda 3 Sensor) misc, , Disp: , Rfl:     FLUoxetine (PROzac) 40 MG capsule, Take 1 capsule by mouth Daily., Disp: , Rfl:     HumaLOG KwikPen 100 UNIT/ML solution pen-injector, INJECT 15 UNITS SUBCUTANEOUSLY THREE TIMES A DAY WITH MEALS, Disp: , Rfl:     Insulin Lispro Prot & Lispro (humaLOG 75-25) (75-25) 100 UNIT/ML suspension pen-injector pen, 2 (Two) Times a Day With Meals. Sliding scale twice daily, Disp: , Rfl:     Lantus SoloStar 100 UNIT/ML injection pen, INJECT 48 UNITS SUBCUTANEOUSLY EVERY NIGHT, Disp: , Rfl:     lisinopril (PRINIVIL,ZESTRIL) 40 MG tablet, Take 1 tablet by mouth Daily., Disp: , Rfl:     metFORMIN (GLUCOPHAGE) 500 MG tablet, Take 2 tablets by mouth 2 (Two) Times a Day With Meals., Disp: , Rfl:     metoprolol tartrate (LOPRESSOR) 25 MG tablet, Take 1 tablet by mouth 2 (Two) Times a Day., Disp: , Rfl:     potassium chloride (K-DUR) 10 MEQ CR tablet, Take 1 tablet by mouth Daily., Disp: , Rfl:      "rosuvastatin (CRESTOR) 40 MG tablet, Take 1 tablet by mouth Daily., Disp: , Rfl:     Tirzepatide (MOUNJARO) 7.5 MG/0.5ML solution pen-injector pen, Inject 0.5 mL under the skin into the appropriate area as directed., Disp: , Rfl:     The following portions of the patient's history were reviewed and updated as appropriate: allergies, current medications, past family history, past medical history, past social history, past surgical history and problem list.    ROS  Review of Systems   14 point ROS negative except for that listed in the HPI.         Objective:     /100   Pulse 85   Ht 185.4 cm (73\")   Wt 97.3 kg (214 lb 9.6 oz)   SpO2 95%   BMI 28.31 kg/m²      Physical Exam  Constitutional: Patient appears well-developed and well-nourished.   HENT: HEENT exam unremarkable.   Neck: Neck supple. No JVD present.   Cardiovascular: Normal rate, regular rhythm and normal heart sounds. No murmur heard.   2+ symmetric pulses.   Pulmonary/Chest: Breath sounds normal.  Musculoskeletal: Does not exhibit edema.   Neurological: Neurological exam unremarkable.   Vitals reviewed.    Data Review:   No recent laboratory studies available for review today.         ECG 12 Lead    Date/Time: 10/4/2024 9:05 AM  Performed by: Karli Dyson PA-C    Authorized by: Karli Dyson PA-C  Comparison: compared with previous ECG from 3/31/2023  Similar to previous ECG  Rhythm: sinus rhythm  Rate: normal  BPM: 79    Clinical impression: normal ECG             Advance Care Planning   ACP discussion was held with the patient during this visit. Patient does not have an advance directive, declines further assistance.           Assessment and plan:      Diagnosis Plan   1. Coronary artery disease involving coronary bypass graft of native heart without angina pectoris  Able without current angina.  Continue aspirin and Plavix.  Continue Crestor 40 mg daily.      2. Essential hypertension  Elevated in the office today.  Patient reports " that he goes to his PCP and endocrinologist frequently and that his blood pressure is always controlled.  Feels like it is elevated today due to pain in his back.  Will begin keeping a log at home and call our office if his SBP is greater than 130.  We will adjust medications accordingly if necessary.      3. Dyslipidemia  Continue Crestor 40 mg daily.        Stable cardiac status.   Continue current medications.   FU in 12 MO, sooner as needed.  Thank you for allowing us to participate in the care of your patient.       Karli Dyson PA-C      Please note that portions of this note may have been completed with a voice recognition program. Efforts were made to edit the dictations, but occasionally words are mistranscribed.

## 2024-10-04 ENCOUNTER — PATIENT ROUNDING (BHMG ONLY) (OUTPATIENT)
Dept: CARDIOLOGY | Facility: CLINIC | Age: 59
End: 2024-10-04
Payer: COMMERCIAL

## 2024-10-04 ENCOUNTER — OFFICE VISIT (OUTPATIENT)
Dept: CARDIOLOGY | Facility: CLINIC | Age: 59
End: 2024-10-04
Payer: COMMERCIAL

## 2024-10-04 VITALS
DIASTOLIC BLOOD PRESSURE: 100 MMHG | SYSTOLIC BLOOD PRESSURE: 140 MMHG | OXYGEN SATURATION: 95 % | BODY MASS INDEX: 28.44 KG/M2 | HEIGHT: 73 IN | WEIGHT: 214.6 LBS | HEART RATE: 85 BPM

## 2024-10-04 DIAGNOSIS — I10 ESSENTIAL HYPERTENSION: ICD-10-CM

## 2024-10-04 DIAGNOSIS — I25.810 CORONARY ARTERY DISEASE INVOLVING CORONARY BYPASS GRAFT OF NATIVE HEART WITHOUT ANGINA PECTORIS: Primary | ICD-10-CM

## 2024-10-04 DIAGNOSIS — E78.5 DYSLIPIDEMIA: ICD-10-CM

## 2024-10-04 RX ORDER — INSULIN LISPRO 100 [IU]/ML
INJECTION, SOLUTION INTRAVENOUS; SUBCUTANEOUS
COMMUNITY
Start: 2024-07-29

## 2024-10-04 RX ORDER — INSULIN GLARGINE 100 [IU]/ML
INJECTION, SOLUTION SUBCUTANEOUS
COMMUNITY
Start: 2024-07-01

## 2024-10-04 RX ORDER — ASPIRIN 325 MG
325 TABLET, DELAYED RELEASE (ENTERIC COATED) ORAL DAILY
COMMUNITY

## 2024-10-04 RX ORDER — CLOPIDOGREL BISULFATE 75 MG/1
75 TABLET ORAL DAILY
Qty: 90 TABLET | Refills: 3 | Status: SHIPPED | OUTPATIENT
Start: 2024-10-04

## 2024-10-04 NOTE — PROGRESS NOTES
October 4, 2024    Hello, may I speak with Andry Bteh?    My name is ALYCIA WELCH      I am  with [facility] CARDIOLOGY  [address]  [place] [address] [phone].    Before we get started may I verify your date of birth? 1965    I am calling to officially welcome you to our practice and ask about your recent visit. Is this a good time to talk? yes    Tell me about your visit with us. What things went well?  CHECKED IN EARLY, GOT IN AND OUT QUICK.       We're always looking for ways to make our patients' experiences even better. Do you have recommendations on ways we may improve?  no, YOU ALL WERE GOOD.    Overall were you satisfied with your first visit to our practice? yes       I appreciate you taking the time to speak with me today. Is there anything else I can do for you? no      Thank you, and have a great day.

## (undated) DEVICE — NC TREK CORONARY DILATATION CATHETER 2.5 MM X 12 MM / RAPID-EXCHANGE: Brand: NC TREK

## (undated) DEVICE — CATH DIAG EXPO M/ PK 5F FL4/FR4 PIG

## (undated) DEVICE — MODEL AT P65, P/N 701554-001KIT CONTENTS: HAND CONTROLLER, 3-WAY HIGH-PRESSURE STOPCOCK WITH ROTATING END AND PREMIUM HIGH-PRESSURE TUBING: Brand: ANGIOTOUCH® KIT

## (undated) DEVICE — MINI TREK CORONARY DILATATION CATHETER 2.0 MM X 12 MM / RAPID-EXCHANGE: Brand: MINI TREK

## (undated) DEVICE — PK CATH CARD 10

## (undated) DEVICE — ST INF PRI SMRTSTE 20DRP 2VLV 24ML 117

## (undated) DEVICE — GUIDE CATHETER: Brand: MACH1™

## (undated) DEVICE — MODEL BT2000 P/N 700287-012KIT CONTENTS: MANIFOLD WITH SALINE AND CONTRAST PORTS, SALINE TUBING WITH SPIKE AND HAND SYRINGE, TRANSDUCER: Brand: BT2000 AUTOMATED MANIFOLD KIT

## (undated) DEVICE — CATH DIAG EXPO .045 FL3  5F 100CM

## (undated) DEVICE — GW LUGE .014 182 CM

## (undated) DEVICE — ST EXT IV SMARTSITE 2VLV SP M LL 5ML IV1

## (undated) DEVICE — INTRO SHEATH PRELUDE IDEAL SPRNG COIL 021 6F 23X80CM

## (undated) DEVICE — DEV COMP RAD PRELUDESYNC 24CM

## (undated) DEVICE — Device: Brand: MIRACLEBROS 3

## (undated) DEVICE — CATH DIAG IMPULSE IMT 6F 100CM

## (undated) DEVICE — DEV INFL MONARCH 25W

## (undated) DEVICE — GW INQWIRE FC PTFE STD J/1.5 .035 260